# Patient Record
Sex: MALE | Race: WHITE | NOT HISPANIC OR LATINO | Employment: OTHER | URBAN - METROPOLITAN AREA
[De-identification: names, ages, dates, MRNs, and addresses within clinical notes are randomized per-mention and may not be internally consistent; named-entity substitution may affect disease eponyms.]

---

## 2018-08-06 ENCOUNTER — HOSPITAL ENCOUNTER (OUTPATIENT)
Dept: CT IMAGING | Facility: CLINIC | Age: 83
Discharge: HOME | End: 2018-08-06
Attending: PREVENTIVE MEDICINE | Admitting: PREVENTIVE MEDICINE

## 2018-08-06 DIAGNOSIS — Z00.6 RESEARCH EXAM: ICD-10-CM

## 2018-08-06 PROCEDURE — 75571 CT HRT W/O DYE W/CA TEST: CPT | Mod: TC

## 2022-11-04 ENCOUNTER — HOSPITAL ENCOUNTER (OUTPATIENT)
Dept: NUCLEAR MEDICINE | Facility: CLINIC | Age: 87
Setting detail: NUCLEAR MEDICINE
Discharge: HOME | End: 2022-11-04
Attending: PREVENTIVE MEDICINE

## 2022-11-04 DIAGNOSIS — Z00.6 RESEARCH EXAM: ICD-10-CM

## 2022-11-04 PROCEDURE — 78830 RP LOCLZJ TUM SPECT W/CT 1: CPT | Mod: TC

## 2022-11-04 PROCEDURE — 343N000001 HC RX 343

## 2022-11-04 PROCEDURE — A9538 TC99M PYROPHOSPHATE: HCPCS

## 2022-11-04 RX ORDER — TECHNETIUM TC99M PYROPHOSPHATE 12 MG/10ML
0-20 INJECTION INTRAVENOUS ONCE
Status: COMPLETED | OUTPATIENT
Start: 2022-11-04 | End: 2022-11-04

## 2022-11-04 RX ADMIN — TECHNETIUM TC99M PYROPHOSPHATE 15.8 MILLICURIE: 12 INJECTION INTRAVENOUS at 10:11

## 2022-11-17 ENCOUNTER — LAB REQUISITION (OUTPATIENT)
Dept: LAB | Facility: CLINIC | Age: 87
End: 2022-11-17
Payer: MEDICARE

## 2022-11-17 DIAGNOSIS — R76.12 NONSPECIFIC REACTION TO CELL MEDIATED IMMUNITY MEASUREMENT OF GAMMA INTERFERON ANTIGEN RESPONSE WITHOUT ACTIVE TUBERCULOSIS: ICD-10-CM

## 2022-11-18 PROCEDURE — 36415 COLL VENOUS BLD VENIPUNCTURE: CPT | Performed by: FAMILY MEDICINE

## 2022-11-18 PROCEDURE — 86481 TB AG RESPONSE T-CELL SUSP: CPT | Performed by: FAMILY MEDICINE

## 2022-11-18 PROCEDURE — P9603 ONE-WAY ALLOW PRORATED MILES: HCPCS | Performed by: FAMILY MEDICINE

## 2022-11-19 LAB
QUANTIFERON MITOGEN: 2.08 IU/ML
QUANTIFERON NIL TUBE: 0.02 IU/ML
QUANTIFERON TB1 TUBE: 0.03 IU/ML
QUANTIFERON TB2 TUBE: 0.02

## 2022-11-20 ENCOUNTER — LAB REQUISITION (OUTPATIENT)
Dept: LAB | Facility: CLINIC | Age: 87
End: 2022-11-20
Payer: COMMERCIAL

## 2022-11-20 DIAGNOSIS — R53.83 OTHER FATIGUE: ICD-10-CM

## 2022-11-20 LAB
ALBUMIN UR-MCNC: 50 MG/DL
ANION GAP SERPL CALCULATED.3IONS-SCNC: 13 MMOL/L (ref 7–15)
APPEARANCE UR: ABNORMAL
BACTERIA #/AREA URNS HPF: ABNORMAL /HPF
BILIRUB UR QL STRIP: NEGATIVE
BUN SERPL-MCNC: 21.7 MG/DL (ref 8–23)
CALCIUM SERPL-MCNC: 9.4 MG/DL (ref 8.8–10.2)
CHLORIDE SERPL-SCNC: 95 MMOL/L (ref 98–107)
COLOR UR AUTO: YELLOW
CREAT SERPL-MCNC: 1.36 MG/DL (ref 0.67–1.17)
DEPRECATED HCO3 PLAS-SCNC: 23 MMOL/L (ref 22–29)
GAMMA INTERFERON BACKGROUND BLD IA-ACNC: 0.02 IU/ML
GFR SERPL CREATININE-BSD FRML MDRD: 51 ML/MIN/1.73M2
GLUCOSE SERPL-MCNC: 246 MG/DL (ref 70–99)
GLUCOSE UR STRIP-MCNC: 200 MG/DL
HGB UR QL STRIP: ABNORMAL
KETONES UR STRIP-MCNC: ABNORMAL MG/DL
LEUKOCYTE ESTERASE UR QL STRIP: ABNORMAL
M TB IFN-G BLD-IMP: NEGATIVE
M TB IFN-G CD4+ BCKGRND COR BLD-ACNC: 2.06 IU/ML
MITOGEN IGNF BCKGRD COR BLD-ACNC: 0 IU/ML
MITOGEN IGNF BCKGRD COR BLD-ACNC: 0.01 IU/ML
NITRATE UR QL: NEGATIVE
PH UR STRIP: 6 [PH] (ref 5–7)
POTASSIUM SERPL-SCNC: 4.9 MMOL/L (ref 3.4–5.3)
RBC URINE: 13 /HPF
SODIUM SERPL-SCNC: 131 MMOL/L (ref 136–145)
SP GR UR STRIP: 1.02 (ref 1–1.03)
SQUAMOUS EPITHELIAL: <1 /HPF
TRANSITIONAL EPI: <1 /HPF
UROBILINOGEN UR STRIP-MCNC: NORMAL MG/DL
WBC URINE: 136 /HPF

## 2022-11-20 PROCEDURE — 80048 BASIC METABOLIC PNL TOTAL CA: CPT | Performed by: FAMILY MEDICINE

## 2022-11-20 PROCEDURE — 81001 URINALYSIS AUTO W/SCOPE: CPT | Performed by: FAMILY MEDICINE

## 2022-11-21 ENCOUNTER — LAB REQUISITION (OUTPATIENT)
Dept: LAB | Facility: CLINIC | Age: 87
End: 2022-11-21
Payer: COMMERCIAL

## 2022-11-21 DIAGNOSIS — R53.83 OTHER FATIGUE: ICD-10-CM

## 2022-11-21 LAB
BASOPHILS # BLD AUTO: 0.1 10E3/UL (ref 0–0.2)
BASOPHILS NFR BLD AUTO: 1 %
EOSINOPHIL # BLD AUTO: 0.7 10E3/UL (ref 0–0.7)
EOSINOPHIL NFR BLD AUTO: 7 %
ERYTHROCYTE [DISTWIDTH] IN BLOOD BY AUTOMATED COUNT: 15.3 % (ref 10–15)
HCT VFR BLD AUTO: 38.2 % (ref 40–53)
HGB BLD-MCNC: 12.9 G/DL (ref 13.3–17.7)
IMM GRANULOCYTES # BLD: 0.1 10E3/UL
IMM GRANULOCYTES NFR BLD: 1 %
LYMPHOCYTES # BLD AUTO: 1.1 10E3/UL (ref 0.8–5.3)
LYMPHOCYTES NFR BLD AUTO: 11 %
MCH RBC QN AUTO: 30.3 PG (ref 26.5–33)
MCHC RBC AUTO-ENTMCNC: 33.8 G/DL (ref 31.5–36.5)
MCV RBC AUTO: 90 FL (ref 78–100)
MONOCYTES # BLD AUTO: 1.5 10E3/UL (ref 0–1.3)
MONOCYTES NFR BLD AUTO: 16 %
NEUTROPHILS # BLD AUTO: 6.3 10E3/UL (ref 1.6–8.3)
NEUTROPHILS NFR BLD AUTO: 64 %
NRBC # BLD AUTO: 0 10E3/UL
NRBC BLD AUTO-RTO: 0 /100
PLATELET # BLD AUTO: 176 10E3/UL (ref 150–450)
RBC # BLD AUTO: 4.26 10E6/UL (ref 4.4–5.9)
WBC # BLD AUTO: 9.8 10E3/UL (ref 4–11)

## 2022-11-21 PROCEDURE — 85025 COMPLETE CBC W/AUTO DIFF WBC: CPT | Performed by: FAMILY MEDICINE

## 2022-11-22 ENCOUNTER — LAB REQUISITION (OUTPATIENT)
Dept: LAB | Facility: CLINIC | Age: 87
End: 2022-11-22
Payer: COMMERCIAL

## 2022-11-22 DIAGNOSIS — N18.9 CHRONIC KIDNEY DISEASE, UNSPECIFIED: ICD-10-CM

## 2022-11-23 ENCOUNTER — LAB REQUISITION (OUTPATIENT)
Dept: LAB | Facility: CLINIC | Age: 87
End: 2022-11-23
Payer: MEDICARE

## 2022-11-23 DIAGNOSIS — D72.829 ELEVATED WHITE BLOOD CELL COUNT, UNSPECIFIED: ICD-10-CM

## 2022-11-23 LAB
ANION GAP SERPL CALCULATED.3IONS-SCNC: 12 MMOL/L (ref 7–15)
BUN SERPL-MCNC: 25.7 MG/DL (ref 8–23)
CALCIUM SERPL-MCNC: 9 MG/DL (ref 8.8–10.2)
CHLORIDE SERPL-SCNC: 98 MMOL/L (ref 98–107)
CREAT SERPL-MCNC: 1.32 MG/DL (ref 0.67–1.17)
DEPRECATED HCO3 PLAS-SCNC: 23 MMOL/L (ref 22–29)
GFR SERPL CREATININE-BSD FRML MDRD: 53 ML/MIN/1.73M2
GLUCOSE SERPL-MCNC: 179 MG/DL (ref 70–99)
POTASSIUM SERPL-SCNC: 3.8 MMOL/L (ref 3.4–5.3)
SODIUM SERPL-SCNC: 133 MMOL/L (ref 136–145)

## 2022-11-23 PROCEDURE — 36415 COLL VENOUS BLD VENIPUNCTURE: CPT | Performed by: FAMILY MEDICINE

## 2022-11-23 PROCEDURE — P9603 ONE-WAY ALLOW PRORATED MILES: HCPCS | Performed by: FAMILY MEDICINE

## 2022-11-23 PROCEDURE — 82310 ASSAY OF CALCIUM: CPT | Performed by: FAMILY MEDICINE

## 2022-12-06 ENCOUNTER — LAB REQUISITION (OUTPATIENT)
Dept: LAB | Facility: CLINIC | Age: 87
End: 2022-12-06
Payer: COMMERCIAL

## 2022-12-06 DIAGNOSIS — N18.9 CHRONIC KIDNEY DISEASE, UNSPECIFIED: ICD-10-CM

## 2022-12-07 LAB
ANION GAP SERPL CALCULATED.3IONS-SCNC: 12 MMOL/L (ref 7–15)
BUN SERPL-MCNC: 13.8 MG/DL (ref 8–23)
CALCIUM SERPL-MCNC: 9.4 MG/DL (ref 8.8–10.2)
CHLORIDE SERPL-SCNC: 103 MMOL/L (ref 98–107)
CREAT SERPL-MCNC: 1.07 MG/DL (ref 0.67–1.17)
DEPRECATED HCO3 PLAS-SCNC: 23 MMOL/L (ref 22–29)
GFR SERPL CREATININE-BSD FRML MDRD: 68 ML/MIN/1.73M2
GLUCOSE SERPL-MCNC: 91 MG/DL (ref 70–99)
POTASSIUM SERPL-SCNC: 4.2 MMOL/L (ref 3.4–5.3)
SODIUM SERPL-SCNC: 138 MMOL/L (ref 136–145)

## 2022-12-07 PROCEDURE — P9603 ONE-WAY ALLOW PRORATED MILES: HCPCS | Performed by: FAMILY MEDICINE

## 2022-12-07 PROCEDURE — 36415 COLL VENOUS BLD VENIPUNCTURE: CPT | Performed by: FAMILY MEDICINE

## 2022-12-07 PROCEDURE — 80048 BASIC METABOLIC PNL TOTAL CA: CPT | Performed by: FAMILY MEDICINE

## 2023-08-30 ENCOUNTER — OFFICE VISIT (OUTPATIENT)
Dept: PODIATRY | Facility: OTHER | Age: 88
End: 2023-08-30
Payer: COMMERCIAL

## 2023-08-30 VITALS
WEIGHT: 183 LBS | DIASTOLIC BLOOD PRESSURE: 80 MMHG | SYSTOLIC BLOOD PRESSURE: 137 MMHG | TEMPERATURE: 97.3 F | HEIGHT: 70 IN | HEART RATE: 71 BPM | BODY MASS INDEX: 26.2 KG/M2

## 2023-08-30 DIAGNOSIS — M76.70 PERONEAL TENDINITIS, UNSPECIFIED LATERALITY: ICD-10-CM

## 2023-08-30 DIAGNOSIS — M15.0 PRIMARY OSTEOARTHRITIS INVOLVING MULTIPLE JOINTS: ICD-10-CM

## 2023-08-30 DIAGNOSIS — E11.40 CONTROLLED TYPE 2 DIABETES WITH NEUROPATHY (H): Primary | ICD-10-CM

## 2023-08-30 PROCEDURE — 99203 OFFICE O/P NEW LOW 30 MIN: CPT | Performed by: PODIATRIST

## 2023-08-30 RX ORDER — INSULIN ASPART 100 [IU]/ML
6 INJECTION, SOLUTION INTRAVENOUS; SUBCUTANEOUS
COMMUNITY
Start: 2022-12-19

## 2023-08-30 RX ORDER — INSULIN LISPRO 100 [IU]/ML
INJECTION, SOLUTION INTRAVENOUS; SUBCUTANEOUS
COMMUNITY
Start: 2023-01-16

## 2023-08-30 RX ORDER — PROCHLORPERAZINE 25 MG/1
SUPPOSITORY RECTAL
COMMUNITY
Start: 2022-11-08

## 2023-08-30 RX ORDER — VITAMIN B COMPLEX
1000 TABLET ORAL DAILY
COMMUNITY

## 2023-08-30 RX ORDER — PREDNISONE 1 MG/1
3 TABLET ORAL
COMMUNITY
Start: 2023-06-27

## 2023-08-30 RX ORDER — METOPROLOL TARTRATE 25 MG/1
TABLET, FILM COATED ORAL
COMMUNITY
Start: 2023-03-30

## 2023-08-30 RX ORDER — FUROSEMIDE 20 MG
1 TABLET ORAL EVERY MORNING
COMMUNITY
Start: 2022-11-22

## 2023-08-30 RX ORDER — FAMOTIDINE 40 MG/1
40 TABLET, FILM COATED ORAL DAILY
COMMUNITY
Start: 2023-05-08

## 2023-08-30 RX ORDER — POTASSIUM CHLORIDE 750 MG/1
1 TABLET, EXTENDED RELEASE ORAL
COMMUNITY
Start: 2023-05-08

## 2023-08-30 RX ORDER — WARFARIN SODIUM 3 MG/1
TABLET ORAL
COMMUNITY
Start: 2023-08-24

## 2023-08-30 ASSESSMENT — PAIN SCALES - GENERAL: PAINLEVEL: MODERATE PAIN (5)

## 2023-08-30 NOTE — PATIENT INSTRUCTIONS
"DIABETES AND YOUR FEET    What effect does diabetes have on the feet?  Diabetes can result in several problems in the feet including contractures of the tendons leading to deformities and reduced function of the bones, skin ulcers or open sores on pressure points or prominent deformities, reduced sensation, reduced blood flow and thus reduced oxygen and immune cells to the tiny vessels in our feet. This all leads to higher risk of hospitalization, infections, and amputations.     What is neuropathy?  Neuropathy is a term used to describe a loss of nerve function.  Patients with diabetes are at risk of developing neuropathy if their sugars continue to run high and are above the normal value of 140.  The elevated blood sugar in the body enters the nerves causing it to swell and impair nerve function.  The higher the blood sugar and the longer it is elevated, the more damage is done to nerves.  This damage is permanent and irreversible.  These damaged sensory nerves can then cause reduced feeling or cause pain.  Damaged motor nerves can reduce blood flow and white blood cells into into your foot, skin and bones reducing your ability to heal a small problem. And neuropathy can cause tendons to become unbalanced and contribute to the formation of deformity and contractures in our feet. Often times, neuropathy can be prevented by controlling your blood sugar.  Your risk of developing neuropathy goes up dramatically as your hemoglobin A1C raises above 7.5.      How do I know if I have neuropathy?  When a person develops neuropathy, they usually begin to feel numbness or tingling in their feet and sometimes in their legs.  Other symptoms may include painful burning or hot feet, tingling, electrical sensations or feeling like insects or ants are crawling on your feet or legs.  If blood sugar remains above 140  for long periods of time, neuropathy can also occur in the hands.  When a person loses their \"protective threshold\" " or ability to detect a 5.07 [place] Cosme monofilament is when they have elevated risk for developing foot deformity, contractures, foot infections, amputations, Charcot arthropathy, or other complications. Keep your hemoglobin A1C below 7.5 to reduce this risk.    What is vascular disease?  Peripheral vascular disease is a term used to describe a loss or decrease in circulation (blood flow).  There is a problem in getting blood, immune cells, and oxygen to areas that need it.  Similar to neuropathy, sugars can build up in the walls of the arteries (blood vessels) and cause them to become swollen, thickened and hardened.  This decreases the amount of blood that can go to an area that needs it.  Though this is common in the legs of diabetic patients, it can also affect other arteries (blood vessels) in the body such as in the heart, kidney, eyes, and the blood flow into bones.  It is often seen first in the small vessels of her body notably our feet and toes.    How do I know if I have vascular disease?  In the legs, vascular disease usually results in cramping.  Patients who develop leg cramps after walking the same distance every time (i.e. One block, half a mile, ect.) need to let their doctors know so that their circulation may be checked.  Cramps causing severe pain in the feet and/or legs while sleeping and the cramps go away when you stand or hang your legs off the side of the bed, may also be a sign of poor blood circulation.  Occasional cramping in cold weather or on rare occasions with activity may not be due to poor circulation, but you should inform your doctor.    How can these problems be prevented?  The key to prevention is good blood sugar control all day every day.  Inadequate blood sugar control is the most common way patients experience these problems. Reducing, controlling and measuring your daily consumption of sugar or carbohydrates is essential to understanding and managing diabetes.   Physical activity (exercise) is a very good way to help decrease your blood sugars.  Exercise can lower your blood sugar, blood pressure, and cholesterol.  It also reduces your risk for heart disease and stroke, relieves stress, and strengthens your heart, muscles and bones. Physical activity also increases your balance and reduces development of contractures and foot deformities over time. In addition, regular activity helps insulin work better, improves your blood circulation, and keeps your joints flexible.  If you're trying to lose weight, a combination of exercise and wise food choices can help you reach your target weight and maintain it.  Activity and exercise alone can not make up for poor diet choices, eating too much, or eating too many sugars or carbohydrates.  Ask your doctor for help when you are not meeting your blood sugar goals. Changes or increases in medication are powerful tools in reducing your blood sugar.    Know your blood sugar and hemoglobin A1C trend.  Upon first diagnosis or during acute illness, checking your blood sugar 4 times a day can help you understand how your diet, activity, and lifestyle affect your blood sugar.  Monitoring your hemoglobin A1C can help you understand how well you are managing blood sugar over the long run.  Your hemoglobin A1C tells you what your blood sugar averages all day, every day, over the past 90 days.       To experience the lowest risk of complications associated with diabetes such as neuropathy, loss of blood flow, bone or joint infection, charcot arthropathy, or amputation, the American Diabetes Association recommends a target hemoglobin A1C of less than 7.0%, while the American Association of Clinical Endocrinologists' recommendation is 6.5% or less.  Both organizations advise that the goals be individualized based on patient factors such as other health conditions, history of hypoglycemia, education, and life expectancy.  A patients risk of  experiencing complications associated with diabetes is only slightly elevated with a hemoglobin A1C above 6.0.  However, this risk goes up exponentially when the hemoglobin A1C is above 7.5.  The longer the hemoglobin A1C is elevated, the more risk that patient will experience in their lifetime. The damage that occurs to nerves, blood vessels, tendons, bones and body organs, while their hemoglobin A1C is elevated is mostly irreversible and worsens with each additional time period of elevated hemoglobin A1C.     You must understand and manage your disease.  Your health insurance or medical team cannot manage this disease for you.  When you take responsibility for understanding and managing your disease, you can expect to experience fewer problems associated with diabetes in your lifetime.  You will  Also experience a higher quality of life and health and reduced cost of health care.    Diabetic Foot Care Recommendations  The following are recommendations for avoiding serious foot problems or injury    DO'S  1. Be aware of your hemoglobin A1C and continue to follow up with your medical team for adjustments in your lifestyle and medication until your reach your A1C goal.  Keep this below 7.5 to reduce your risk of developing complications associated with diabetes.    2.  Wash your feet with lukewarm water and a mild soap and then dry them thoroughly, especially between the toes.  Gently floss your towel or washcloth between each toe at every bath.  Soaking your feet in water cannot clean dead skin, debris, and bacteria from your feet and is not necessary.   3. Examine your feet daily looking for cuts, corns, blisters, cracks, ect..., especially after wearing new shoes or increased or changed activities.  Make sure to look between your toes.  If you cannot see the bottom of your feet, set a mirror on the floor and hold your foot over it, or ask a family member to examine your feet for you daily.  Contact your doctor  immediately if new problems are noted or if sores are not healing.  4.  Immediately apply moisturizer cream such as Cetaphil to the tops and bottoms of your feet, avoiding areas between the toes.  Apply sunscreen or cover your feet if they will be exposed to extended sunlight.  5.Use clean comfortable shoes.  Socks should not have thick seams or cut off the circulation around the leg.  Break in new shoes slowly and rotate with older shoes until broken in.  Check the inside of your shoes with your hand to look for areas of irritation or objects that may have fallen into your shoes.    6. Keep slippers by the side of your bed for use during the night.  7. Shoes should be fitted by a professional and should not cause areas of irritation.  Check your feet regularly when wearing a new pair of shoes and replace them as needed.  8.  Talk to your doctor about proper exercise.  Exercise and stretching stimulate blood flow to your feet and maintain proper glucose levels.  Use it or lose it!  9.  Monitor your blood glucose level and your hemoglobin A1C.  Notify your doctor immediately if your blood sugar is abnormally high or low.  10.  Cut your nails straight across, but then gently round any sharp edges with a nail file.  If you have neuropathy, peripheral vascular disease or cannot see that well to trim your own toenails, see a medical professional for care.    DONT'S  1.  Do not soak your feet if you have an open sore or your provider has informed you that you have neuropathy or loss of protective threshold.  Use only lukewarm water and always check the temperature with your hand as hot water can easily burn your feet.    2.  Never use a hot water bottle or heating pad on your feet.  Also do not apply hot or cold compresses to your feet.  With decreased sensation, you could burn or freeze your feet.  Do not rest your feet near a heat source such as a heater or heat register.    3.  Do not apply any of these to your  "feet:    - over the counter medicine for corns or warts    -  Harsh chemicals like boric acid    -  Do not self-treat corns, cuts, blisters or infections.  Always consult your doctor.   4.  Do not wear sandals, slippers or walk barefoot, especially on harsh surfaces.  5.  If you smoke, stop!!!        Nail Debridement    A high quality instrument makes trimming toenails MUCH easier.  Search [employer] for any 5\" nail nipper manufactured by reliable brands such as Miltex, Integra or Jarit as these quality instruments will help manage difficult nails more effectively and comfortably. We use Miltex -SS.  A physician is not necessary to trim nails even if you are taking blood thinners or are diabetic.  Your family or care givers may help manage your toenails.      Trim or sand the nails once weekly.  Do not wait until they are long and painful or trimming will become too difficult and painful and will increase your risk of complications or infection.  A course file or 120 grit sandpaper on a sanding block can be helpful.  For very thick nails many people prefer battery operated smith such as an Amope', Personal Pedi and Emjoi for regular use or heavy painful callouses or thick toenails.    Trim or skive any portion of nail that is thick, loose, crumbling, or not well attached. Do not tear the nail away, but rather cut them with a nail nipperor sand or sand them down.  You may follow up with your Podiatric Physician if you have pain, bleeding, infection, questions or other concerns.      You may also contact the following Registered Nurses for further help with nail debridement and minor hygiene concnerns.  They may come to your home or meet them at their clinic to trim your toenails and soak your feet, as well as monitor for any complications that would require evaluation by a Physician.      [facility]  Laurel Jimenez RN  Phone & text [phone]    [facility]  Adina Morris, " RN  Office [phone]    [facility]  [phone]  [email or web address]    For up to date list and to find foot care nurses in other communities visit American Foot Care Nurses Association website:  afcna.org.     Calluses, Corns, IPKs, Porokeratosis    When there is excessive friction or pressure on the skin, the body responds by making the skin thicker.  While this may protect the deeper structures, the thickened skin can take up more space and thus increase pressure over a bony prominence or become an open sore or skin ulcer as this skin becomes less flexible.    Flat, diffuse thickening are simple calluses and they are usually caused by friction.  Often these are the result of rubbing on a shoe or going barefoot.    Calluses with a central core between the toes are called corns.  These often result from prominent joints on adjacent toes rubbing together.  Theses are often a symptom of bone malalignment and will usually recur unless the underlying bones are addressed.    Many of these lesions can be kept comfortable with routine maintenance. This consists of filing them with a Ped Egg, callus file, or 120 grit sandpaper on a block, every day during your bath or shower.  Most people prefer battery operated smith such as an Amope', Personal Pedi and Emjoi for regular use or heavy painful callouses.  Heavy creams or ointments can be applied 1-2 times every day to keep them soft. Toe spacers can be used for corns, gel pads can be used for other lesions on the bottom of the foot. If there is a deformity noted, such as a prominent bone, often this can be addressed to minimize recurrence. However, sometimes the pressure and lesion simply migrates to another spot after surgery, so it is not a guaranteed cure.     If you have severe callouses and cracking, you may apply heavy ointments that you scoop up such as Cetaphil cream, Eucerin, Aquaphor or Vaseline.  Be sure to obtain cream  or ointment in these brands and not lotion (lotion is water based and not durable enough for feet). For more aggressive help apply heavy creams or ointment under occlusive dressings such as Saran Wrap or Jelly Feet while sleeping.   Jelly Feet can be obtained at [email or web address].     To be successful with managing hyperkeratotic skin, you must manage hygiene daily.  Apply the cream once or twice EVERY day.  At your bath or shower time is the easiest time to work on this when skin is most soft.  There is no medical or surgical treatment that will absolutely eliminate many of these symptoms.      Pedifix is a reliable source for all sorts of foot pads, cushions, or interdigital spacers and foot appliances. Go to www.[employer].Th[employer] or request a catalog at [employer].        Please call with any additional questions.

## 2023-08-30 NOTE — LETTER
8/30/2023         RE: Corwin Fontaine  [address] [facility] [address]        Dear Colleague,    Thank you for referring your patient, Corwin Fontaine, to the [facility]. Please see a copy of my visit note below.    HPI:  Corwin Fontaine is a 88 year old male who is seen in consultation at the request of self.    Pt presents for eval of:   (Onset, Location, L/R, Character, Treatments, Injury if yes)    DM type 2    Warfarin    Stroke 2020, left side     Ongoing for 1 year, lateral Left ankle pain that is more constant June 2023. Presents with his youngest daughter.  Pain in lateral malleolus left and under right 5th met head.  Hx of ulcers on both feet.     Retired.      Review of Systems:  Patient denies fever, chills, rash, wound, stiffness, limping, numbness, weakness, heart burn, blood in stool, chest pain with activity, calf pain when walking, shortness of breath with activity, chronic cough, easy bleeding/bruising, swelling of ankles, excessive thirst, fatigue, depression, anxiety.  Patient admits only to symptoms noted in history.     There is no problem list on file for this patient.    PAST MEDICAL HISTORY: History reviewed. No pertinent past medical history.  PAST SURGICAL HISTORY: History reviewed. No pertinent surgical history.  MEDICATIONS:   Current Outpatient Medications:      Continuous Blood Gluc Sensor (DEXCOM G6 SENSOR) MISC, TO BE USED TO READ BLOOD SUGARS, FOLLOW  DIRECTIONS. CHANGE SENSOR EVERY 10 DAYS., Disp: , Rfl:      famotidine (PEPCID) 40 MG tablet, Take 40 mg by mouth daily, Disp: , Rfl:      Ferrous Sulfate 5 MG/20ML LIQD, ferrous sulfate, Disp: , Rfl:      furosemide (LASIX) 20 MG tablet, Take 1 tablet by mouth every morning, Disp: , Rfl:      Insulin Aspart FlexPen 100 UNIT/ML SOPN, Inject 6 Units Subcutaneous, Disp: , Rfl:      insulin lispro (HUMALOG VIAL) 100 UNIT/ML vial,  UNITS DAILY VIA INSULIN PUMP, Disp: , Rfl:       "Methotrexate, PF, (RASUVO) 12.5 MG/0.25ML autoinjector, 0.25 mLs, Disp: , Rfl:      metoprolol tartrate (LOPRESSOR) 25 MG tablet, TAKE 1 TABLET TWICE A DAY (REPLACES PREVIOUS PRESCRIPTION), Disp: , Rfl:      potassium chloride ER (K-TAB/KLOR-CON) 10 MEQ CR tablet, Take 1 tablet by mouth daily with food, Disp: , Rfl:      predniSONE (DELTASONE) 1 MG tablet, Take 3 mg by mouth, Disp: , Rfl:      Vitamin D3 (VITAMIN D-1000 MAX ST) 25 mcg (1000 units) tablet, Take 1,000 Units by mouth daily, Disp: , Rfl:      warfarin ANTICOAGULANT (COUMADIN) 3 MG tablet, Take by mouth 6 mg (3 mg x 2) every Tue, Sat; 4.5 mg (3 mg x 1.5) all other days, Disp: , Rfl:   ALLERGIES:    Allergies   Allergen Reactions     Bee Venom Hives and Rash     SOCIAL HISTORY:   Social History     Socioeconomic History     Marital status:      Spouse name: Not on file     Number of children: Not on file     Years of education: Not on file     Highest education level: Not on file   Occupational History     Not on file   Tobacco Use     Smoking status: Former     Types: Cigarettes     Smokeless tobacco: Former   Substance and Sexual Activity     Alcohol use: Not on file     Drug use: Not on file     Sexual activity: Not on file   Other Topics Concern     Not on file   Social History Narrative     Not on file     Social Determinants of Health     Financial Resource Strain: Not on file   Food Insecurity: Not on file   Transportation Needs: Not on file   Physical Activity: Not on file   Stress: Not on file   Social Connections: Not on file   Intimate Partner Violence: Not on file   Housing Stability: Not on file     FAMILY HISTORY: History reviewed. No pertinent family history.  EXAM:Vitals: /80 (BP Location: Left arm, Patient Position: Sitting, Cuff Size: Adult Regular)   Pulse 71   Temp 97.3  F (36.3  C) (Temporal)   Ht 1.765 m (5' 9.5\")   Wt 83 kg (183 lb)   BMI 26.64 kg/m    BMI= Body mass index is 26.64 kg/m .    General appearance: " Patient is alert and fully cooperative with history & exam.  No sign of distress is noted during the visit.     Psychiatric: Affect is pleasant & appropriate.  Patient appears motivated to improve health.     Respiratory: Breathing is regular & unlabored while sitting.     HEENT: Hearing is intact to spoken word.  Speech is clear.  No gross evidence of visual impairment that would impact ambulation.       Vascular: DP 1/4 & PT 0/4 left & right.  CFT delayed with dependent rubor noted about the digits.  Diminished hair growth distal to mid tibia and no hair about the foot and toes.  Temperature changes noted, warm to cool proximal to distal.  Hemosiderin pigmentation noted with multiple varicosities legs and feet bilateral. Generalized edema bilateral legs and feet.  Pt denies claudication history.     Neurologic: Normal plantar response bilateral.  Managed protective threshold plus 0/10 applications of a 5.07 monofilament.  Pt admits  burning and paraesthesias about the feet and toes with palpation.     Dermatologic: All 10 nails are thickened, elongated, discolored and painful with palpation and debridement.  Diminished texture turgor and tone about the integument.  Skin is thin & shiny.  No  Pre ulcerative hyperkeratosis noted.  No abscess or full thickness ulcerations noted.     Musculoskeletal: Patient is ambulatory without assistive device or brace.  There is semi reducible contracture of the lesser digits.  Subtle discomfort is noted about the lateral ankle sinus tarsi peroneal tendons fifth metatarsal base.  No palpable localized edema.  No erythema or heat.  Mildly reduced range of motion through the ankle and subtalar joint.    Creatinine (mg/dL)   Date Value   12/07/2022 1.07   11/23/2022 1.32 (H)   11/20/2022 1.36 (H)     ASSESSMENT:       ICD-10-CM    1. 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 Peroneal tendinitis, unspecified laterality  M76.70       3. Primary osteoarthritis  involving multiple joints  M15.9            PLAN:    8/30/2023  Recommend appropriate shoe gear to provide stability about the lateral column peroneal tendons.  Just got DM shoes within this year.  Discussed the importance of appropriate shoe gear.  May modify the shoe if they are not working properly or fitting well.  All questions were answered  Follow-up if this remains symptomatic otherwise once yearly for diabetic foot exam.      Darrian Degroot DPM          Again, thank you for allowing me to participate in the care of your patient.        Sincerely,        Darrian Degroot DPM

## 2023-08-30 NOTE — PROGRESS NOTES
HPI:  Corwin Fontaine is a 88 year old male who is seen in consultation at the request of self.    Pt presents for eval of:   (Onset, Location, L/R, Character, Treatments, Injury if yes)    DM type 2    Warfarin    Stroke 2020, left side     Ongoing for 1 year, lateral Left ankle pain that is more constant June 2023. Presents with his youngest daughter.  Pain in lateral malleolus left and under right 5th met head.  Hx of ulcers on both feet.     Retired.      Review of Systems:  Patient denies fever, chills, rash, wound, stiffness, limping, numbness, weakness, heart burn, blood in stool, chest pain with activity, calf pain when walking, shortness of breath with activity, chronic cough, easy bleeding/bruising, swelling of ankles, excessive thirst, fatigue, depression, anxiety.  Patient admits only to symptoms noted in history.     There is no problem list on file for this patient.    PAST MEDICAL HISTORY: History reviewed. No pertinent past medical history.  PAST SURGICAL HISTORY: History reviewed. No pertinent surgical history.  MEDICATIONS:   Current Outpatient Medications:     Continuous Blood Gluc Sensor (DEXCOM G6 SENSOR) MISC, TO BE USED TO READ BLOOD SUGARS, FOLLOW  DIRECTIONS. CHANGE SENSOR EVERY 10 DAYS., Disp: , Rfl:     famotidine (PEPCID) 40 MG tablet, Take 40 mg by mouth daily, Disp: , Rfl:     Ferrous Sulfate 5 MG/20ML LIQD, ferrous sulfate, Disp: , Rfl:     furosemide (LASIX) 20 MG tablet, Take 1 tablet by mouth every morning, Disp: , Rfl:     Insulin Aspart FlexPen 100 UNIT/ML SOPN, Inject 6 Units Subcutaneous, Disp: , Rfl:     insulin lispro (HUMALOG VIAL) 100 UNIT/ML vial,  UNITS DAILY VIA INSULIN PUMP, Disp: , Rfl:     Methotrexate, PF, (RASUVO) 12.5 MG/0.25ML autoinjector, 0.25 mLs, Disp: , Rfl:     metoprolol tartrate (LOPRESSOR) 25 MG tablet, TAKE 1 TABLET TWICE A DAY (REPLACES PREVIOUS PRESCRIPTION), Disp: , Rfl:     potassium chloride ER (K-TAB/KLOR-CON) 10 MEQ CR tablet,  "Take 1 tablet by mouth daily with food, Disp: , Rfl:     predniSONE (DELTASONE) 1 MG tablet, Take 3 mg by mouth, Disp: , Rfl:     Vitamin D3 (VITAMIN D-1000 MAX ST) 25 mcg (1000 units) tablet, Take 1,000 Units by mouth daily, Disp: , Rfl:     warfarin ANTICOAGULANT (COUMADIN) 3 MG tablet, Take by mouth 6 mg (3 mg x 2) every Tue, Sat; 4.5 mg (3 mg x 1.5) all other days, Disp: , Rfl:   ALLERGIES:    Allergies   Allergen Reactions    Bee Venom Hives and Rash     SOCIAL HISTORY:   Social History     Socioeconomic History    Marital status:      Spouse name: Not on file    Number of children: Not on file    Years of education: Not on file    Highest education level: Not on file   Occupational History    Not on file   Tobacco Use    Smoking status: Former     Types: Cigarettes    Smokeless tobacco: Former   Substance and Sexual Activity    Alcohol use: Not on file    Drug use: Not on file    Sexual activity: Not on file   Other Topics Concern    Not on file   Social History Narrative    Not on file     Social Determinants of Health     Financial Resource Strain: Not on file   Food Insecurity: Not on file   Transportation Needs: Not on file   Physical Activity: Not on file   Stress: Not on file   Social Connections: Not on file   Intimate Partner Violence: Not on file   Housing Stability: Not on file     FAMILY HISTORY: History reviewed. No pertinent family history.  EXAM:Vitals: /80 (BP Location: Left arm, Patient Position: Sitting, Cuff Size: Adult Regular)   Pulse 71   Temp 97.3  F (36.3  C) (Temporal)   Ht 1.765 m (5' 9.5\")   Wt 83 kg (183 lb)   BMI 26.64 kg/m    BMI= Body mass index is 26.64 kg/m .    General appearance: Patient is alert and fully cooperative with history & exam.  No sign of distress is noted during the visit.     Psychiatric: Affect is pleasant & appropriate.  Patient appears motivated to improve health.     Respiratory: Breathing is regular & unlabored while sitting.     HEENT: " Hearing is intact to spoken word.  Speech is clear.  No gross evidence of visual impairment that would impact ambulation.       Vascular: DP 1/4 & PT 0/4 left & right.  CFT delayed with dependent rubor noted about the digits.  Diminished hair growth distal to mid tibia and no hair about the foot and toes.  Temperature changes noted, warm to cool proximal to distal.  Hemosiderin pigmentation noted with multiple varicosities legs and feet bilateral. Generalized edema bilateral legs and feet.  Pt denies claudication history.     Neurologic: Normal plantar response bilateral.  Managed protective threshold plus 0/10 applications of a 5.07 monofilament.  Pt admits  burning and paraesthesias about the feet and toes with palpation.     Dermatologic: All 10 nails are thickened, elongated, discolored and painful with palpation and debridement.  Diminished texture turgor and tone about the integument.  Skin is thin & shiny.  No  Pre ulcerative hyperkeratosis noted.  No abscess or full thickness ulcerations noted.     Musculoskeletal: Patient is ambulatory without assistive device or brace.  There is semi reducible contracture of the lesser digits.  Subtle discomfort is noted about the lateral ankle sinus tarsi peroneal tendons fifth metatarsal base.  No palpable localized edema.  No erythema or heat.  Mildly reduced range of motion through the ankle and subtalar joint.    Creatinine (mg/dL)   Date Value   12/07/2022 1.07   11/23/2022 1.32 (H)   11/20/2022 1.36 (H)     ASSESSMENT:       ICD-10-CM    1. 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 Peroneal tendinitis, unspecified laterality  M76.70       3. Primary osteoarthritis involving multiple joints  M15.9            PLAN:    8/30/2023  Recommend appropriate shoe gear to provide stability about the lateral column peroneal tendons.  Just got DM shoes within this year.  Discussed the importance of appropriate shoe gear.  May modify the shoe if they are not  working properly or fitting well.  All questions were answered  Follow-up if this remains symptomatic otherwise once yearly for diabetic foot exam.      Darrian Degroot DPM

## 2024-06-24 ENCOUNTER — OFFICE VISIT (OUTPATIENT)
Dept: PODIATRY | Facility: CLINIC | Age: 89
End: 2024-06-24
Payer: COMMERCIAL

## 2024-06-24 VITALS
WEIGHT: 188 LBS | SYSTOLIC BLOOD PRESSURE: 102 MMHG | HEIGHT: 70 IN | BODY MASS INDEX: 26.92 KG/M2 | DIASTOLIC BLOOD PRESSURE: 58 MMHG

## 2024-06-24 DIAGNOSIS — L97.509 TYPE 2 DIABETES MELLITUS WITH FOOT ULCER, WITH LONG-TERM CURRENT USE OF INSULIN (H): ICD-10-CM

## 2024-06-24 DIAGNOSIS — E11.40 CONTROLLED TYPE 2 DIABETES WITH NEUROPATHY (H): Primary | ICD-10-CM

## 2024-06-24 DIAGNOSIS — Z79.4 TYPE 2 DIABETES MELLITUS WITH FOOT ULCER, WITH LONG-TERM CURRENT USE OF INSULIN (H): ICD-10-CM

## 2024-06-24 DIAGNOSIS — E11.621 TYPE 2 DIABETES MELLITUS WITH FOOT ULCER, WITH LONG-TERM CURRENT USE OF INSULIN (H): ICD-10-CM

## 2024-06-24 PROCEDURE — 99213 OFFICE O/P EST LOW 20 MIN: CPT | Mod: 25 | Performed by: PODIATRIST

## 2024-06-24 PROCEDURE — 11042 DBRDMT SUBQ TIS 1ST 20SQCM/<: CPT | Performed by: PODIATRIST

## 2024-06-24 RX ORDER — FOLIC ACID 1 MG/1
TABLET ORAL
COMMUNITY

## 2024-06-24 RX ORDER — ATORVASTATIN CALCIUM 10 MG/1
10 TABLET, FILM COATED ORAL
COMMUNITY
End: 2024-06-24

## 2024-06-24 RX ORDER — ALENDRONATE SODIUM 35 MG/1
TABLET ORAL
COMMUNITY

## 2024-06-24 RX ORDER — FERROUS SULFATE 325(65) MG
325 TABLET ORAL
COMMUNITY
Start: 2022-11-16

## 2024-06-24 ASSESSMENT — PAIN SCALES - GENERAL: PAINLEVEL: SEVERE PAIN (7)

## 2024-06-24 NOTE — PROGRESS NOTES
Chief Complaint   Patient presents with    Diabetes     Type 2    RECHECK     Callus Left and Right 1st and 5th metatarsal head; LOV 8/30/2023    Other     Presents with his daughter 6/24/2024     HPI:  Corwin Fontaine is a 88 year old male who is seen in consultation at the request of self.    Pt presents for eval of:   (Onset, Location, L/R, Character, Treatments, Injury if yes)    DM type 2    Warfarin    Stroke 2020, left side     Ongoing for 1 year, lateral Left ankle pain that is more constant June 2023. Presents with his youngest daughter.  Pain in lateral malleolus left and under right 5th met head.  Hx of ulcers on both feet.     Retired.      Review of Systems:  Patient denies fever, chills, rash, wound, stiffness, limping, numbness, weakness, heart burn, blood in stool, chest pain with activity, calf pain when walking, shortness of breath with activity, chronic cough, easy bleeding/bruising, swelling of ankles, excessive thirst, fatigue, depression, anxiety.  Patient admits only to symptoms noted in history.     There is no problem list on file for this patient.    PAST MEDICAL HISTORY: History reviewed. No pertinent past medical history.  PAST SURGICAL HISTORY: History reviewed. No pertinent surgical history.  MEDICATIONS:   Current Outpatient Medications:     alendronate (FOSAMAX) 35 MG tablet, Take 1 tablet every week by oral route., Disp: , Rfl:     Continuous Blood Gluc Sensor (DEXCOM G6 SENSOR) MISC, TO BE USED TO READ BLOOD SUGARS, FOLLOW  DIRECTIONS. CHANGE SENSOR EVERY 10 DAYS., Disp: , Rfl:     famotidine (PEPCID) 40 MG tablet, Take 40 mg by mouth daily, Disp: , Rfl:     ferrous sulfate (FEROSUL) 325 (65 Fe) MG tablet, Take 325 mg by mouth, Disp: , Rfl:     furosemide (LASIX) 20 MG tablet, Take 1 tablet by mouth every morning, Disp: , Rfl:     Insulin Aspart FlexPen 100 UNIT/ML SOPN, Inject 6 Units Subcutaneous, Disp: , Rfl:     insulin lispro (HUMALOG VIAL) 100 UNIT/ML vial, ,  Disp: , Rfl:     metoprolol tartrate (LOPRESSOR) 25 MG tablet, TAKE 1 TABLET TWICE A DAY (REPLACES PREVIOUS PRESCRIPTION), Disp: , Rfl:     potassium chloride ER (K-TAB/KLOR-CON) 10 MEQ CR tablet, Take 1 tablet by mouth daily with food, Disp: , Rfl:     predniSONE (DELTASONE) 1 MG tablet, Take 3 mg by mouth, Disp: , Rfl:     Vitamin D3 (VITAMIN D-1000 MAX ST) 25 mcg (1000 units) tablet, Take 1,000 Units by mouth daily, Disp: , Rfl:     warfarin ANTICOAGULANT (COUMADIN) 3 MG tablet, Take by mouth 6 mg (3 mg x 2) every Tue, Sat; 4.5 mg (3 mg x 1.5) all other days, Disp: , Rfl:     Ferrous Sulfate 5 MG/20ML LIQD, ferrous sulfate, Disp: , Rfl:     folic acid (FOLVITE) 1 MG tablet, Take 1 tablet every day by oral route., Disp: , Rfl:     Methotrexate, PF, (RASUVO) 12.5 MG/0.25ML autoinjector, 0.25 mLs (Patient not taking: Reported on 6/24/2024), Disp: , Rfl:   ALLERGIES:    Allergies   Allergen Reactions    Bee Venom Hives and Rash     SOCIAL HISTORY:   Social History     Socioeconomic History    Marital status:      Spouse name: Not on file    Number of children: Not on file    Years of education: Not on file    Highest education level: Not on file   Occupational History    Not on file   Tobacco Use    Smoking status: Former     Types: Cigarettes    Smokeless tobacco: Former   Substance and Sexual Activity    Alcohol use: Not on file    Drug use: Not on file    Sexual activity: Not on file   Other Topics Concern    Not on file   Social History Narrative    Not on file     Social Determinants of Health     Financial Resource Strain: Low Risk  (5/8/2023)    [facility], [facility]    Financial Resource Strain     Difficulty of Paying Living Expenses: 3     Difficulty of Paying Living Expenses: Not on file   Food Insecurity: No Food Insecurity (5/8/2023)    [facility], [facility]    Food Insecurity     Worried About Running Out of Food in the Last Year: 1   Transportation Needs: No Transportation Needs (5/8/2023)    [email or web address], [facility]    Transportation Needs     Lack of Transportation (Medical): 1   Physical Activity: Inactive (8/27/2020)    Received from [facility]    Exercise Vital Sign     Days of Exercise per Week: 0 days     Minutes of Exercise per Session: 30 min   Stress: No Stress Concern Present (8/27/2020)    Received from [facility]    [place] [employer] - Occupational Stress Questionnaire     Feeling of Stress : Only a little   Social Connections: Unknown (5/8/2024)    [facility]    Social Connections     Frequency of Communication with Friends and Family: Not on file   Interpersonal Safety: Not on file   Housing Stability: Low Risk  (5/8/2023)    [facility], [facility]    Housing Stability     Unable to Pay for Housing in the Last Year: 1     FAMILY HISTORY: History reviewed. No pertinent family history.  EXAM:Vitals: /58 (BP Location: Left arm, Patient Position: Sitting, Cuff Size: Adult Regular)   Ht 1.765 m (5' 9.5\")   Wt 85.3 kg (188 lb)   BMI 27.36 kg/m    BMI= Body mass index is 27.36 kg/m .    General appearance: Patient is alert and fully cooperative with history & exam.  No sign of distress is noted during the visit.     Psychiatric: Affect is pleasant & appropriate.  Patient appears motivated to improve health.     Respiratory: Breathing is regular & unlabored while sitting.     HEENT: Hearing is intact to spoken word.  Speech is clear.  No gross evidence of visual impairment that would impact ambulation.       Vascular: DP 1/4 & PT 0/4 left & right.  CFT delayed with " dependent rubor noted about the digits.  Diminished hair growth distal to mid tibia and no hair about the foot and toes.  Temperature changes noted, warm to cool proximal to distal.  Hemosiderin pigmentation noted with multiple varicosities legs and feet bilateral. Generalized edema bilateral legs and feet.  Pt denies claudication history.     Neurologic: Normal plantar response bilateral.  Diminished protective threshold plus 0/10 applications of a 5.07 monofilament.  Pt admits  burning and paraesthesias about the feet and toes with palpation.     Dermatologic: All 10 nails are dystrophic but in reasonable repair diminished texture turgor and tone about the integument.  Skin is thin & shiny.  Full-thickness ulceration is noted about the right first metatarsal head with some hyperkeratosis on the fifth metatarsal head as well.  Upon debridement moist bloody drainage is noted with subcutaneous tissue measuring about 2 mm.     Musculoskeletal: Patient is ambulatory without assistive device or brace.  There is semi reducible contracture of the lesser digits.  Subtle discomfort is noted about the lateral ankle sinus tarsi peroneal tendons fifth metatarsal base.  No palpable localized edema.  No erythema or heat.  Mildly reduced range of motion through the ankle and subtalar joint.    Creatinine (mg/dL)   Date Value   12/07/2022 1.07   11/23/2022 1.32 (H)   11/20/2022 1.36 (H)     ASSESSMENT:       ICD-10-CM    1. 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 Type 2 diabetes mellitus with foot ulcer, with long-term current use of insulin (H)  E11.621 Orthotics, Mastectomy and Custom Compression Orders    L97.509 CANCELED: Orthotics, Mastectomy and Custom Compression Orders    Z79.4           PLAN:    8/30/2023  Recommend appropriate shoe gear to provide stability about the lateral column  peroneal tendons.  Just got DM shoes within this year.  Discussed the importance of appropriate shoe gear.  May modify the shoe if they are not working properly or fitting well.  All questions were answered  Follow-up if this remains symptomatic otherwise once yearly for diabetic foot exam.    6/24/2024  I debrided the ulceration plantar right first metatarsal head to and including subcutaneous tissue with a 15 blade scalpel.  Order for DM shoes at [place] to be able to offload first and fifth met heads.  Sterile dressing applied  RTC 1-2 months to confirm the ulceration is resolved  Due to neuropathy recommend staying in diabetic shoes only.      Darrian Degroot DPM

## 2024-06-24 NOTE — LETTER
6/24/2024      Corwin Fontaine  [address] [address] [address]      Dear Colleague,    Thank you for referring your patient, Corwin Fontaine, to the [facility]. Please see a copy of my visit note below.    Chief Complaint   Patient presents with     Diabetes     Type 2     RECHECK     Callus Left and Right 1st and 5th metatarsal head; LOV 8/30/2023     Other     Presents with his daughter 6/24/2024     HPI:  Corwin Fontaine is a 88 year old male who is seen in consultation at the request of self.    Pt presents for eval of:   (Onset, Location, L/R, Character, Treatments, Injury if yes)    DM type 2    Warfarin    Stroke 2020, left side     Ongoing for 1 year, lateral Left ankle pain that is more constant June 2023. Presents with his youngest daughter.  Pain in lateral malleolus left and under right 5th met head.  Hx of ulcers on both feet.     Retired.      Review of Systems:  Patient denies fever, chills, rash, wound, stiffness, limping, numbness, weakness, heart burn, blood in stool, chest pain with activity, calf pain when walking, shortness of breath with activity, chronic cough, easy bleeding/bruising, swelling of ankles, excessive thirst, fatigue, depression, anxiety.  Patient admits only to symptoms noted in history.     There is no problem list on file for this patient.    PAST MEDICAL HISTORY: History reviewed. No pertinent past medical history.  PAST SURGICAL HISTORY: History reviewed. No pertinent surgical history.  MEDICATIONS:   Current Outpatient Medications:      alendronate (FOSAMAX) 35 MG tablet, Take 1 tablet every week by oral route., Disp: , Rfl:      Continuous Blood Gluc Sensor (DEXCOM G6 SENSOR) MISC, TO BE USED TO READ BLOOD SUGARS, FOLLOW  DIRECTIONS. CHANGE SENSOR EVERY 10 DAYS., Disp: , Rfl:      famotidine (PEPCID) 40 MG tablet, Take 40 mg by mouth daily, Disp: , Rfl:      ferrous sulfate (FEROSUL) 325 (65 Fe) MG tablet, Take 325 mg by  mouth, Disp: , Rfl:      furosemide (LASIX) 20 MG tablet, Take 1 tablet by mouth every morning, Disp: , Rfl:      Insulin Aspart FlexPen 100 UNIT/ML SOPN, Inject 6 Units Subcutaneous, Disp: , Rfl:      insulin lispro (HUMALOG VIAL) 100 UNIT/ML vial, , Disp: , Rfl:      metoprolol tartrate (LOPRESSOR) 25 MG tablet, TAKE 1 TABLET TWICE A DAY (REPLACES PREVIOUS PRESCRIPTION), Disp: , Rfl:      potassium chloride ER (K-TAB/KLOR-CON) 10 MEQ CR tablet, Take 1 tablet by mouth daily with food, Disp: , Rfl:      predniSONE (DELTASONE) 1 MG tablet, Take 3 mg by mouth, Disp: , Rfl:      Vitamin D3 (VITAMIN D-1000 MAX ST) 25 mcg (1000 units) tablet, Take 1,000 Units by mouth daily, Disp: , Rfl:      warfarin ANTICOAGULANT (COUMADIN) 3 MG tablet, Take by mouth 6 mg (3 mg x 2) every Tue, Sat; 4.5 mg (3 mg x 1.5) all other days, Disp: , Rfl:      Ferrous Sulfate 5 MG/20ML LIQD, ferrous sulfate, Disp: , Rfl:      folic acid (FOLVITE) 1 MG tablet, Take 1 tablet every day by oral route., Disp: , Rfl:      Methotrexate, PF, (RASUVO) 12.5 MG/0.25ML autoinjector, 0.25 mLs (Patient not taking: Reported on 6/24/2024), Disp: , Rfl:   ALLERGIES:    Allergies   Allergen Reactions     Bee Venom Hives and Rash     SOCIAL HISTORY:   Social History     Socioeconomic History     Marital status:      Spouse name: Not on file     Number of children: Not on file     Years of education: Not on file     Highest education level: Not on file   Occupational History     Not on file   Tobacco Use     Smoking status: Former     Types: Cigarettes     Smokeless tobacco: Former   Substance and Sexual Activity     Alcohol use: Not on file     Drug use: Not on file     Sexual activity: Not on file   Other Topics Concern     Not on file   Social History Narrative     Not on file     Social Determinants of Health     Financial Resource Strain: Low Risk  (5/8/2023)    [facility] [employer] [facility]    Financial Resource Strain      Difficulty of Paying Living Expenses: 3      Difficulty of Paying Living Expenses: Not on file   Food Insecurity: No Food Insecurity (5/8/2023)    Received from [facility], [facility]    Food Insecurity      Worried About Running Out of Food in the Last Year: 1   Transportation Needs: No Transportation Needs (5/8/2023)    Received from [facility], [facility]    Transportation Needs      Lack of Transportation (Medical): 1   Physical Activity: Inactive (8/27/2020)    Received from [facility]    Exercise Vital Sign      Days of Exercise per Week: 0 days      Minutes of Exercise per Session: 30 min   Stress: No Stress Concern Present (8/27/2020)    Received from [facility]    [place] of Occupational Health - Occupational Stress Questionnaire      Feeling of Stress : Only a little   Social Connections: Unknown (5/8/2024)    Received from [facility]    Social Connections      Frequency of Communication with Friends and Family: Not on file   Interpersonal Safety: Not on file   Housing Stability: Low Risk  (5/8/2023)    Received from [facility], [facility]    Housing Stability      Unable to Pay for Housing in the Last Year: 1     FAMILY HISTORY: History reviewed. No pertinent family history.  EXAM:Vitals: /58 (BP Location: Left arm, Patient Position: Sitting, Cuff Size: Adult Regular)   Ht 1.765 m (5' 9.5\")   Wt 85.3 kg (188 lb)   BMI 27.36 kg/m    BMI= Body mass index is 27.36 kg/m .    General appearance: Patient is alert and fully cooperative with history & exam.  No sign of distress is noted during the visit.     Psychiatric: Affect is pleasant & appropriate.  Patient appears " motivated to improve health.     Respiratory: Breathing is regular & unlabored while sitting.     HEENT: Hearing is intact to spoken word.  Speech is clear.  No gross evidence of visual impairment that would impact ambulation.       Vascular: DP 1/4 & PT 0/4 left & right.  CFT delayed with dependent rubor noted about the digits.  Diminished hair growth distal to mid tibia and no hair about the foot and toes.  Temperature changes noted, warm to cool proximal to distal.  Hemosiderin pigmentation noted with multiple varicosities legs and feet bilateral. Generalized edema bilateral legs and feet.  Pt denies claudication history.     Neurologic: Normal plantar response bilateral.  Diminished protective threshold plus 0/10 applications of a 5.07 monofilament.  Pt admits  burning and paraesthesias about the feet and toes with palpation.     Dermatologic: All 10 nails are dystrophic but in reasonable repair diminished texture turgor and tone about the integument.  Skin is thin & shiny.  Full-thickness ulceration is noted about the right first metatarsal head with some hyperkeratosis on the fifth metatarsal head as well.  Upon debridement moist bloody drainage is noted with subcutaneous tissue measuring about 2 mm.     Musculoskeletal: Patient is ambulatory without assistive device or brace.  There is semi reducible contracture of the lesser digits.  Subtle discomfort is noted about the lateral ankle sinus tarsi peroneal tendons fifth metatarsal base.  No palpable localized edema.  No erythema or heat.  Mildly reduced range of motion through the ankle and subtalar joint.    Creatinine (mg/dL)   Date Value   12/07/2022 1.07   11/23/2022 1.32 (H)   11/20/2022 1.36 (H)     ASSESSMENT:       ICD-10-CM    1. 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 Type 2 diabetes mellitus with foot ulcer,  with long-term current use of insulin (H)  E11.621 Orthotics, Mastectomy and Custom Compression Orders    L97.509 CANCELED: Orthotics, Mastectomy and Custom Compression Orders    Z79.4           PLAN:    8/30/2023  Recommend appropriate shoe gear to provide stability about the lateral column peroneal tendons.  Just got DM shoes within this year.  Discussed the importance of appropriate shoe gear.  May modify the shoe if they are not working properly or fitting well.  All questions were answered  Follow-up if this remains symptomatic otherwise once yearly for diabetic foot exam.    6/24/2024  I debrided the ulceration plantar right first metatarsal head to and including subcutaneous tissue with a 15 blade scalpel.  Order for DM shoes at [place] to be able to offload first and fifth met heads.  Sterile dressing applied  RTC 1-2 months to confirm the ulceration is resolved  Due to neuropathy recommend staying in diabetic shoes only.      Darrian Degroot DPM                Again, thank you for allowing me to participate in the care of your patient.        Sincerely,        Darrian Degroot DPM

## 2024-08-21 ENCOUNTER — OFFICE VISIT (OUTPATIENT)
Dept: PODIATRY | Facility: OTHER | Age: 89
End: 2024-08-21
Payer: COMMERCIAL

## 2024-08-21 VITALS
DIASTOLIC BLOOD PRESSURE: 78 MMHG | HEIGHT: 70 IN | HEART RATE: 75 BPM | SYSTOLIC BLOOD PRESSURE: 129 MMHG | WEIGHT: 190 LBS | BODY MASS INDEX: 27.2 KG/M2 | TEMPERATURE: 97.6 F

## 2024-08-21 DIAGNOSIS — Z79.4 TYPE 2 DIABETES MELLITUS WITH FOOT ULCER, WITH LONG-TERM CURRENT USE OF INSULIN (H): ICD-10-CM

## 2024-08-21 DIAGNOSIS — M15.0 PRIMARY OSTEOARTHRITIS INVOLVING MULTIPLE JOINTS: ICD-10-CM

## 2024-08-21 DIAGNOSIS — L97.509 TYPE 2 DIABETES MELLITUS WITH FOOT ULCER, WITH LONG-TERM CURRENT USE OF INSULIN (H): ICD-10-CM

## 2024-08-21 DIAGNOSIS — E11.40 CONTROLLED TYPE 2 DIABETES WITH NEUROPATHY (H): Primary | ICD-10-CM

## 2024-08-21 DIAGNOSIS — E11.621 TYPE 2 DIABETES MELLITUS WITH FOOT ULCER, WITH LONG-TERM CURRENT USE OF INSULIN (H): ICD-10-CM

## 2024-08-21 PROCEDURE — 99213 OFFICE O/P EST LOW 20 MIN: CPT | Performed by: PODIATRIST

## 2024-08-21 ASSESSMENT — PAIN SCALES - GENERAL: PAINLEVEL: SEVERE PAIN (6)

## 2024-08-21 NOTE — PROGRESS NOTES
Chief Complaint   Patient presents with    WOUND CARE     Ulcer plantar Right 1st metatarsal head, onset June 2024; LOV 6/24/2024  Callus Left and Right 1st and 5th metatarsal head; LOV 6/24/2024    Diabetes     Type 2, picking up DM shoes 8/28/2024 on his 89th bday    Other     Presents with his daughter 8/21/2024  pacemaker     HPI:  Corwin Fontaine is a 88 year old male who is seen in consultation at the request of self.    Pt presents for eval of:   (Onset, Location, L/R, Character, Treatments, Injury if yes)    DM type 2    Warfarin    Stroke 2020, left side     Ongoing for 1 year, lateral Left ankle pain that is more constant June 2023. Presents with his youngest daughter.  Pain in lateral malleolus left and under right 5th met head.  Hx of ulcers on both feet.     Retired.    Review of Systems:  Patient denies fever, chills, rash, wound, stiffness, limping, numbness, weakness, heart burn, blood in stool, chest pain with activity, calf pain when walking, shortness of breath with activity, chronic cough, easy bleeding/bruising, swelling of ankles, excessive thirst, fatigue, depression, anxiety.  Patient admits only to symptoms noted in history.     There is no problem list on file for this patient.    PAST MEDICAL HISTORY: History reviewed. No pertinent past medical history.  PAST SURGICAL HISTORY: History reviewed. No pertinent surgical history.  MEDICATIONS:   Current Outpatient Medications:     alendronate (FOSAMAX) 35 MG tablet, Take 1 tablet every week by oral route., Disp: , Rfl:     Continuous Blood Gluc Sensor (DEXCOM G6 SENSOR) MISC, TO BE USED TO READ BLOOD SUGARS, FOLLOW  DIRECTIONS. CHANGE SENSOR EVERY 10 DAYS., Disp: , Rfl:     famotidine (PEPCID) 40 MG tablet, Take 40 mg by mouth daily, Disp: , Rfl:     ferrous sulfate (FEROSUL) 325 (65 Fe) MG tablet, Take 325 mg by mouth, Disp: , Rfl:     Ferrous Sulfate 5 MG/20ML LIQD, ferrous sulfate, Disp: , Rfl:     folic acid (FOLVITE) 1 MG  tablet, Take 1 tablet every day by oral route., Disp: , Rfl:     furosemide (LASIX) 20 MG tablet, Take 1 tablet by mouth every morning, Disp: , Rfl:     Insulin Aspart FlexPen 100 UNIT/ML SOPN, Inject 6 Units Subcutaneous, Disp: , Rfl:     insulin lispro (HUMALOG VIAL) 100 UNIT/ML vial, , Disp: , Rfl:     metoprolol tartrate (LOPRESSOR) 25 MG tablet, TAKE 1 TABLET TWICE A DAY (REPLACES PREVIOUS PRESCRIPTION), Disp: , Rfl:     potassium chloride ER (K-TAB/KLOR-CON) 10 MEQ CR tablet, Take 1 tablet by mouth daily with food, Disp: , Rfl:     predniSONE (DELTASONE) 1 MG tablet, Take 3 mg by mouth, Disp: , Rfl:     Vitamin D3 (VITAMIN D-1000 MAX ST) 25 mcg (1000 units) tablet, Take 1,000 Units by mouth daily, Disp: , Rfl:     warfarin ANTICOAGULANT (COUMADIN) 3 MG tablet, Take by mouth 6 mg (3 mg x 2) every Tue, Sat; 4.5 mg (3 mg x 1.5) all other days, Disp: , Rfl:     Methotrexate, PF, (RASUVO) 12.5 MG/0.25ML autoinjector, 0.25 mLs (Patient not taking: Reported on 6/24/2024), Disp: , Rfl:   ALLERGIES:    Allergies   Allergen Reactions    Bee Venom Hives and Rash     SOCIAL HISTORY:   Social History     Socioeconomic History    Marital status:      Spouse name: Not on file    Number of children: Not on file    Years of education: Not on file    Highest education level: Not on file   Occupational History    Not on file   Tobacco Use    Smoking status: Former     Types: Cigarettes    Smokeless tobacco: Former   Substance and Sexual Activity    Alcohol use: Not on file    Drug use: Not on file    Sexual activity: Not on file   Other Topics Concern    Not on file   Social History Narrative    Not on file     Social Determinants of Health     Financial Resource Strain: Low Risk  (5/8/2023)    [facility]    Financial Resource Strain     Difficulty of Paying Living Expenses: 3     Difficulty of Paying Living Expenses: Not on file  "  Food Insecurity: No Food Insecurity (5/8/2023)    Received from [facility], [facility]    Food Insecurity     Worried About Running Out of Food in the Last Year: 1   Transportation Needs: No Transportation Needs (5/8/2023)    Received from [facility], [facility]    Transportation Needs     Lack of Transportation (Medical): 1   Physical Activity: Inactive (8/27/2020)    Received from [facility]    Exercise Vital Sign     Days of Exercise per Week: 0 days     Minutes of Exercise per Session: 30 min   Stress: No Stress Concern Present (8/27/2020)    Received from [facility]    [place] [facility] [employer] - Occupational Stress Questionnaire     Feeling of Stress : Only a little   Social Connections: Unknown (5/8/2024)    Received from [facility] [place]    Social Connections     Frequency of Communication with Friends and Family: Not on file   Interpersonal Safety: Not on file   Housing Stability: Low Risk  (5/8/2023)    Received from [facility], [facility]    Housing Stability     Unable to Pay for Housing in the Last Year: 1     FAMILY HISTORY: History reviewed. No pertinent family history.  EXAM:Vitals: /78 (BP Location: Right arm, Patient Position: Sitting, Cuff Size: Adult Regular)   Pulse 75   Temp 97.6  F (36.4  C) (Temporal)   Ht 1.765 m (5' 9.5\")   Wt 86.2 kg (190 lb)   BMI 27.66 kg/m    BMI= Body mass index is 27.66 kg/m .    General appearance: Patient is alert and fully cooperative with history & exam.  No sign of distress is noted during the visit.     Psychiatric: Affect is pleasant & appropriate.  Patient appears motivated to improve health.     Respiratory: Breathing is regular & unlabored while sitting.     HEENT: Hearing is " intact to spoken word.  Speech is clear.  No gross evidence of visual impairment that would impact ambulation.       Vascular: DP 1/4 & PT 0/4 left & right.  CFT delayed with dependent rubor noted about the digits.  Diminished hair growth distal to mid tibia and no hair about the foot and toes.  Temperature changes noted, warm to cool proximal to distal.  Hemosiderin pigmentation noted with multiple varicosities legs and feet bilateral. Generalized edema bilateral legs and feet.  Pt denies claudication history.     Neurologic: Normal plantar response bilateral.  Diminished protective threshold plus 0/10 applications of a 5.07 monofilament.  Pt admits  burning and paraesthesias about the feet and toes with palpation.     Dermatologic: All 10 nails are dystrophic but in reasonable repair diminished texture turgor and tone about the integument.  Skin is thin & shiny.  Previous ulceration plantar right first metatarsal head is now closed with hyperkeratosis.     Musculoskeletal: Patient is ambulatory without assistive device or brace.  There is semi reducible contracture of the lesser digits.  Subtle discomfort is noted about the lateral ankle sinus tarsi peroneal tendons fifth metatarsal base.  No palpable localized edema.  No erythema or heat.  Mildly reduced range of motion through the ankle and subtalar joint.    Creatinine (mg/dL)   Date Value   12/07/2022 1.07   11/23/2022 1.32 (H)   11/20/2022 1.36 (H)     ASSESSMENT:       ICD-10-CM    1. 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 Type 2 diabetes mellitus with foot ulcer, with long-term current use of insulin (H)  E11.621     L97.509     Z79.4       3. Primary osteoarthritis involving multiple joints  M15.9           PLAN:    8/30/2023  Recommend appropriate shoe gear to provide stability about the lateral column peroneal tendons.  Just got DM shoes within this year.  Discussed the importance of appropriate shoe gear.  May modify the shoe if  they are not working properly or fitting well.  All questions were answered  Follow-up if this remains symptomatic otherwise once yearly for diabetic foot exam.    6/24/2024  I debrided the ulceration plantar right first metatarsal head to and including subcutaneous tissue with a 15 blade scalpel.  Order for DM shoes at [place] to be able to offload first and fifth met heads.  Sterile dressing applied  RTC 1-2 months to confirm the ulceration is resolved  Due to neuropathy recommend staying in diabetic shoes only.    8/21/2024  Gets DM shoes in 8/28/24  So follow up here in 6 weeks and then probably 9 months so that we can keep Dm shoes in good repair every 12 months       Darrian Degroot DPM

## 2024-08-21 NOTE — PATIENT INSTRUCTIONS
"Nail Debridement    A high quality instrument makes trimming toenails MUCH easier.  Search [place] for any 5\" nail nipper manufactured by reliable brands such as Miltex, Integra or Jarit as these quality instruments will help manage difficult nails more effectively and comfortably. We use Miltex -SS.  A physician is not necessary to trim nails even if you are taking blood thinners or are diabetic.  Your family or care givers may help manage your toenails.      Trim or sand the nails once weekly.  Do not wait until they are long and painful or trimming will become too difficult and painful and will increase your risk of complications or infection.  A course file or 120 grit sandpaper on a sanding block can be helpful.  For very thick nails many people prefer battery operated smith such as an Amope', Personal Pedi and Emjoi for regular use or heavy painful callouses or thick toenails.    Trim or skive any portion of nail that is thick, loose, crumbling, or not well attached. Do not tear the nail away, but rather cut them with a nail nipperor sand or sand them down.  You may follow up with your Podiatric Physician if you have pain, bleeding, infection, questions or other concerns.      You may also contact the following Registered Nurses for further help with nail debridement and minor hygiene concnerns.  They may come to your home or meet them at their clinic to trim your toenails and soak your feet, as well as monitor for any complications that would require evaluation by a Physician.      [facility]  Laurel Jimenez RN  Phone & text [phone]    [facility]  Adina Morris RN  Office [phone]    [facility]  [phone]  [email or web address]    For up to date list and to find foot care nurses in other communities visit American Foot [employer] Nurses Association website:  afcna.org.     Calluses, Corns, IPKs, Porokeratosis    When there is " excessive friction or pressure on the skin, the body responds by making the skin thicker.  While this may protect the deeper structures, the thickened skin can take up more space and thus increase pressure over a bony prominence or become an open sore or skin ulcer as this skin becomes less flexible.    Flat, diffuse thickening are simple calluses and they are usually caused by friction.  Often these are the result of rubbing on a shoe or going barefoot.    Calluses with a central core between the toes are called corns.  These often result from prominent joints on adjacent toes rubbing together.  Theses are often a symptom of bone malalignment and will usually recur unless the underlying bones are addressed.    Many of these lesions can be kept comfortable with routine maintenance. This consists of filing them with a Ped Egg, callus file, or 120 grit sandpaper on a block, every day during your bath or shower.  Most people prefer battery operated smith such as an Amope', Personal Pedi and Emjoi for regular use or heavy painful callouses.  Heavy creams or ointments can be applied 1-2 times every day to keep them soft. Toe spacers can be used for corns, gel pads can be used for other lesions on the bottom of the foot. If there is a deformity noted, such as a prominent bone, often this can be addressed to minimize recurrence. However, sometimes the pressure and lesion simply migrates to another spot after surgery, so it is not a guaranteed cure.     If you have severe callouses and cracking, you may apply heavy ointments that you scoop up such as Cetaphil cream, Eucerin, Aquaphor or Vaseline.  Be sure to obtain cream or ointment in these brands and not lotion (lotion is water based and not durable enough for feet). For more aggressive help apply heavy creams or ointment under occlusive dressings such as Saran Wrap or Jelly Feet while sleeping.   Jelly Feet can be obtained at [email or web address].     To be successful  with managing hyperkeratotic skin, you must manage hygiene daily.  Apply the cream once or twice EVERY day.  At your bath or shower time is the easiest time to work on this when skin is most soft.  There is no medical or surgical treatment that will absolutely eliminate many of these symptoms.      Pedifix is a reliable source for all sorts of foot pads, cushions, or interdigital spacers and foot appliances. Go to www.pedifix."[email or web address] or request a catalog at 1-379-myDocket.        Please call with any additional questions.

## 2024-08-21 NOTE — LETTER
8/21/2024      Corwin Fontaine  [address] [address] [address]      Dear Colleague,    Thank you for referring your patient, Corwin Fontaine, to the [facility]. Please see a copy of my visit note below.    Chief Complaint   Patient presents with     WOUND CARE     Ulcer plantar Right 1st metatarsal head, onset June 2024; LOV 6/24/2024  Callus Left and Right 1st and 5th metatarsal head; LOV 6/24/2024     Diabetes     Type 2, picking up DM shoes 8/28/2024 on his 89th bday     Other     Presents with his daughter 8/21/2024  pacemaker     HPI:  Corwin Fontaine is a 88 year old male who is seen in consultation at the request of self.    Pt presents for eval of:   (Onset, Location, L/R, Character, Treatments, Injury if yes)    DM type 2    Warfarin    Stroke 2020, left side     Ongoing for 1 year, lateral Left ankle pain that is more constant June 2023. Presents with his youngest daughter.  Pain in lateral malleolus left and under right 5th met head.  Hx of ulcers on both feet.     Retired.    Review of Systems:  Patient denies fever, chills, rash, wound, stiffness, limping, numbness, weakness, heart burn, blood in stool, chest pain with activity, calf pain when walking, shortness of breath with activity, chronic cough, easy bleeding/bruising, swelling of ankles, excessive thirst, fatigue, depression, anxiety.  Patient admits only to symptoms noted in history.     There is no problem list on file for this patient.    PAST MEDICAL HISTORY: History reviewed. No pertinent past medical history.  PAST SURGICAL HISTORY: History reviewed. No pertinent surgical history.  MEDICATIONS:   Current Outpatient Medications:      alendronate (FOSAMAX) 35 MG tablet, Take 1 tablet every week by oral route., Disp: , Rfl:      Continuous Blood Gluc Sensor (DEXCOM G6 SENSOR) MISC, TO BE USED TO READ BLOOD SUGARS, FOLLOW  DIRECTIONS. CHANGE SENSOR EVERY 10 DAYS., Disp: , Rfl:      famotidine  (PEPCID) 40 MG tablet, Take 40 mg by mouth daily, Disp: , Rfl:      ferrous sulfate (FEROSUL) 325 (65 Fe) MG tablet, Take 325 mg by mouth, Disp: , Rfl:      Ferrous Sulfate 5 MG/20ML LIQD, ferrous sulfate, Disp: , Rfl:      folic acid (FOLVITE) 1 MG tablet, Take 1 tablet every day by oral route., Disp: , Rfl:      furosemide (LASIX) 20 MG tablet, Take 1 tablet by mouth every morning, Disp: , Rfl:      Insulin Aspart FlexPen 100 UNIT/ML SOPN, Inject 6 Units Subcutaneous, Disp: , Rfl:      insulin lispro (HUMALOG VIAL) 100 UNIT/ML vial, , Disp: , Rfl:      metoprolol tartrate (LOPRESSOR) 25 MG tablet, TAKE 1 TABLET TWICE A DAY (REPLACES PREVIOUS PRESCRIPTION), Disp: , Rfl:      potassium chloride ER (K-TAB/KLOR-CON) 10 MEQ CR tablet, Take 1 tablet by mouth daily with food, Disp: , Rfl:      predniSONE (DELTASONE) 1 MG tablet, Take 3 mg by mouth, Disp: , Rfl:      Vitamin D3 (VITAMIN D-1000 MAX ST) 25 mcg (1000 units) tablet, Take 1,000 Units by mouth daily, Disp: , Rfl:      warfarin ANTICOAGULANT (COUMADIN) 3 MG tablet, Take by mouth 6 mg (3 mg x 2) every Tue, Sat; 4.5 mg (3 mg x 1.5) all other days, Disp: , Rfl:      Methotrexate, PF, (RASUVO) 12.5 MG/0.25ML autoinjector, 0.25 mLs (Patient not taking: Reported on 6/24/2024), Disp: , Rfl:   ALLERGIES:    Allergies   Allergen Reactions     Bee Venom Hives and Rash     SOCIAL HISTORY:   Social History     Socioeconomic History     Marital status:      Spouse name: Not on file     Number of children: Not on file     Years of education: Not on file     Highest education level: Not on file   Occupational History     Not on file   Tobacco Use     Smoking status: Former     Types: Cigarettes     Smokeless tobacco: Former   Substance and Sexual Activity     Alcohol use: Not on file     Drug use: Not on file     Sexual activity: Not on file   Other Topics Concern     Not on file   Social History Narrative     Not on file     Social Determinants of Health     Financial  "Resource Strain: Low Risk  (5/8/2023)    Received from [facility], [facility]    Financial Resource Strain      Difficulty of Paying Living Expenses: 3      Difficulty of Paying Living Expenses: Not on file   Food Insecurity: No Food Insecurity (5/8/2023)    Received from [facility], [facility]    Food Insecurity      Worried About Running Out of Food in the Last Year: 1   Transportation Needs: No Transportation Needs (5/8/2023)    Received from [facility], [facility]    Transportation Needs      Lack of Transportation (Medical): 1   Physical Activity: Inactive (8/27/2020)    Received from [facility]    Exercise Vital Sign      Days of Exercise per Week: 0 days      Minutes of Exercise per Session: 30 min   Stress: No Stress Concern Present (8/27/2020)    Received from [facility]    [place] [employer]ational [employer] - Occupational Stress Questionnaire      Feeling of Stress : Only a little   Social Connections: Unknown (5/8/2024)    Received from [facility]    Social Connections      Frequency of Communication with Friends and Family: Not on file   Interpersonal Safety: Not on file   Housing Stability: Low Risk  (5/8/2023)    Received from [facility], [facility]    Housing Stability      Unable to Pay for Housing in the Last Year: 1     FAMILY HISTORY: History reviewed. No pertinent family history.  EXAM:Vitals: /78 (BP Location: Right arm, Patient Position: Sitting, Cuff Size: Adult Regular)   Pulse 75   Temp 97.6  F (36.4  C) (Temporal)   Ht 1.765 m (5' 9.5\")   Wt 86.2 kg (190 lb)   BMI 27.66 kg/m    BMI= Body mass index is 27.66 kg/m .    General appearance: Patient " is alert and fully cooperative with history & exam.  No sign of distress is noted during the visit.     Psychiatric: Affect is pleasant & appropriate.  Patient appears motivated to improve health.     Respiratory: Breathing is regular & unlabored while sitting.     HEENT: Hearing is intact to spoken word.  Speech is clear.  No gross evidence of visual impairment that would impact ambulation.       Vascular: DP 1/4 & PT 0/4 left & right.  CFT delayed with dependent rubor noted about the digits.  Diminished hair growth distal to mid tibia and no hair about the foot and toes.  Temperature changes noted, warm to cool proximal to distal.  Hemosiderin pigmentation noted with multiple varicosities legs and feet bilateral. Generalized edema bilateral legs and feet.  Pt denies claudication history.     Neurologic: Normal plantar response bilateral.  Diminished protective threshold plus 0/10 applications of a 5.07 monofilament.  Pt admits  burning and paraesthesias about the feet and toes with palpation.     Dermatologic: All 10 nails are dystrophic but in reasonable repair diminished texture turgor and tone about the integument.  Skin is thin & shiny.  Previous ulceration plantar right first metatarsal head is now closed with hyperkeratosis.     Musculoskeletal: Patient is ambulatory without assistive device or brace.  There is semi reducible contracture of the lesser digits.  Subtle discomfort is noted about the lateral ankle sinus tarsi peroneal tendons fifth metatarsal base.  No palpable localized edema.  No erythema or heat.  Mildly reduced range of motion through the ankle and subtalar joint.    Creatinine (mg/dL)   Date Value   12/07/2022 1.07   11/23/2022 1.32 (H)   11/20/2022 1.36 (H)     ASSESSMENT:       ICD-10-CM    1. 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 Type 2 diabetes mellitus with foot ulcer, with long-term current use of insulin (H)  E11.621     L97.509     Z79.4       3. Primary  osteoarthritis involving multiple joints  M15.9           PLAN:    8/30/2023  Recommend appropriate shoe gear to provide stability about the lateral column peroneal tendons.  Just got DM shoes within this year.  Discussed the importance of appropriate shoe gear.  May modify the shoe if they are not working properly or fitting well.  All questions were answered  Follow-up if this remains symptomatic otherwise once yearly for diabetic foot exam.    6/24/2024  I debrided the ulceration plantar right first metatarsal head to and including subcutaneous tissue with a 15 blade scalpel.  Order for DM shoes at [place] to be able to offload first and fifth met heads.  Sterile dressing applied  RTC 1-2 months to confirm the ulceration is resolved  Due to neuropathy recommend staying in diabetic shoes only.    8/21/2024  Gets DM shoes in 8/28/24  So follow up here in 6 weeks and then probably 9 months so that we can keep Dm shoes in good repair every 12 months       Darrian Degroot DPM                Again, thank you for allowing me to participate in the care of your patient.        Sincerely,        Darrian Degroot DPM

## 2024-10-02 ENCOUNTER — OFFICE VISIT (OUTPATIENT)
Dept: PODIATRY | Facility: CLINIC | Age: 89
End: 2024-10-02
Payer: COMMERCIAL

## 2024-10-02 VITALS
DIASTOLIC BLOOD PRESSURE: 80 MMHG | BODY MASS INDEX: 27.49 KG/M2 | WEIGHT: 192 LBS | TEMPERATURE: 97.5 F | SYSTOLIC BLOOD PRESSURE: 128 MMHG | HEIGHT: 70 IN

## 2024-10-02 DIAGNOSIS — E11.40 CONTROLLED TYPE 2 DIABETES WITH NEUROPATHY (H): Primary | ICD-10-CM

## 2024-10-02 DIAGNOSIS — M20.5X1 HALLUX LIMITUS OF RIGHT FOOT: ICD-10-CM

## 2024-10-02 DIAGNOSIS — Z86.31 HISTORY OF DIABETIC ULCER OF FOOT: ICD-10-CM

## 2024-10-02 PROCEDURE — 99213 OFFICE O/P EST LOW 20 MIN: CPT | Performed by: PODIATRIST

## 2024-10-02 ASSESSMENT — PAIN SCALES - GENERAL: PAINLEVEL: NO PAIN (0)

## 2024-10-02 NOTE — LETTER
10/2/2024      Corwin Fontaine  [address] [facility] [address]      Dear Colleague,    Thank you for referring your patient, Corwin Fontaine, to the [facility]. Please see a copy of my visit note below.    Chief Complaint   Patient presents with     WOUND CARE     Ulcer plantar Right 1st metatarsal head, onset June 2024; LOV 8/21/2024  Callus Left and Right 1st and 5th metatarsal head; LOV 6/24/2024     Diabetes     Type 2, picking up DM shoes 8/28/2024 on his 89th bday     Other     Presents with his daughter 8/21/2024  Pacemaker  Warfarin     HPI:  Corwin Fontaine is a 88 year old male who is seen in consultation at the request of self.    Pt presents for eval of:   (Onset, Location, L/R, Character, Treatments, Injury if yes)    DM type 2    Warfarin    Stroke 2020, left side     Ongoing for 1 year, lateral Left ankle pain that is more constant June 2023. Presents with his youngest daughter.  Pain in lateral malleolus left and under right 5th met head.  Hx of ulcers on both feet.     Retired.    Since last visit he has obtained new diabetic shoes and has had difficulty fitting them.  His feet were sliding around and he went back to the orthotist a couple of times to modify and adjust them and he thinks he might have them figured out but he still has some dark spots on the bottom ball of his foot.    Review of Systems:  Patient denies fever, chills, rash, wound, stiffness, limping, numbness, weakness, heart burn, blood in stool, chest pain with activity, calf pain when walking, shortness of breath with activity, chronic cough, easy bleeding/bruising, swelling of ankles, excessive thirst, fatigue, depression, anxiety.  Patient admits only to symptoms noted in history.     There is no problem list on file for this patient.    PAST MEDICAL HISTORY: History reviewed. No pertinent past medical history.  PAST SURGICAL HISTORY: History reviewed. No pertinent surgical  history.  MEDICATIONS:   Current Outpatient Medications:      alendronate (FOSAMAX) 35 MG tablet, Take 1 tablet every week by oral route., Disp: , Rfl:      Continuous Blood Gluc Sensor (DEXCOM G6 SENSOR) MISC, TO BE USED TO READ BLOOD SUGARS, FOLLOW  DIRECTIONS. CHANGE SENSOR EVERY 10 DAYS., Disp: , Rfl:      famotidine (PEPCID) 40 MG tablet, Take 40 mg by mouth daily, Disp: , Rfl:      ferrous sulfate (FEROSUL) 325 (65 Fe) MG tablet, Take 325 mg by mouth, Disp: , Rfl:      Ferrous Sulfate 5 MG/20ML LIQD, ferrous sulfate, Disp: , Rfl:      folic acid (FOLVITE) 1 MG tablet, Take 1 tablet every day by oral route., Disp: , Rfl:      furosemide (LASIX) 20 MG tablet, Take 1 tablet by mouth every morning, Disp: , Rfl:      Insulin Aspart FlexPen 100 UNIT/ML SOPN, Inject 6 Units Subcutaneous, Disp: , Rfl:      insulin lispro (HUMALOG VIAL) 100 UNIT/ML vial, , Disp: , Rfl:      metoprolol tartrate (LOPRESSOR) 25 MG tablet, TAKE 1 TABLET TWICE A DAY (REPLACES PREVIOUS PRESCRIPTION), Disp: , Rfl:      potassium chloride ER (K-TAB/KLOR-CON) 10 MEQ CR tablet, Take 1 tablet by mouth daily with food, Disp: , Rfl:      predniSONE (DELTASONE) 1 MG tablet, Take 3 mg by mouth, Disp: , Rfl:      Vitamin D3 (VITAMIN D-1000 MAX ST) 25 mcg (1000 units) tablet, Take 1,000 Units by mouth daily, Disp: , Rfl:      warfarin ANTICOAGULANT (COUMADIN) 3 MG tablet, Take by mouth 6 mg (3 mg x 2) every Tue, Sat; 4.5 mg (3 mg x 1.5) all other days, Disp: , Rfl:   ALLERGIES:    Allergies   Allergen Reactions     Bee Venom Hives and Rash     SOCIAL HISTORY:   Social History     Socioeconomic History     Marital status:      Spouse name: Not on file     Number of children: Not on file     Years of education: Not on file     Highest education level: Not on file   Occupational History     Not on file   Tobacco Use     Smoking status: Former     Types: Cigarettes     Smokeless tobacco: Former   Substance and Sexual Activity     Alcohol  use: Not on file     Drug use: Not on file     Sexual activity: Not on file   Other Topics Concern     Not on file   Social History Narrative     Not on file     Social Determinants of Health     Financial Resource Strain: Low Risk  (5/8/2023)    [email or web address]    Financial Resource Strain      Difficulty of Paying Living Expenses: 3      Difficulty of Paying Living Expenses: Not on file   Food Insecurity: No Food Insecurity (5/8/2023)    [facility] [employer] [email or web address], [facility]    Food Insecurity      Worried About Running Out of Food in the Last Year: 1   Transportation Needs: No Transportation Needs (5/8/2023)    [email or web address], [facility]    Transportation Needs      Lack of Transportation (Medical): 1   Physical Activity: Inactive (8/27/2020)    [facility], [facility]    Exercise Vital Sign      Days of Exercise per Week: 0 days      Minutes of Exercise per Session: 30 min   Stress: No Stress Concern Present (8/27/2020)    Received from [facility], [facility]    [employer] [facility] [employer] - Occupational Stress Questionnaire      Feeling of Stress : Only a little   Social Connections: Unknown (5/8/2024)    [email or web address]    Social Connections      Frequency of Communication with Friends and Family: Not on file   Interpersonal Safety: Not on file   Housing Stability: Low Risk  (5/8/2023)    [email or web address], [facility] [employer] [facility]    Housing Stability      Unable to Pay for Housing in the Last Year: 1     FAMILY HISTORY: History reviewed. No pertinent family history.  EXAM:Vitals: /80 (BP Location: Left arm, Patient Position: Sitting, Cuff  "Size: Adult Regular)   Temp 97.5  F (36.4  C) (Temporal)   Ht 1.765 m (5' 9.5\")   Wt 87.1 kg (192 lb)   BMI 27.95 kg/m    BMI= Body mass index is 27.95 kg/m .    General appearance: Patient is alert and fully cooperative with history & exam.  No sign of distress is noted during the visit.     Psychiatric: Affect is pleasant & appropriate.  Patient appears motivated to improve health.     Respiratory: Breathing is regular & unlabored while sitting.     HEENT: Hearing is intact to spoken word.  Speech is clear.  No gross evidence of visual impairment that would impact ambulation.       Vascular: DP 1/4 & PT 0/4 left & right.  CFT delayed with dependent rubor noted about the digits.  Diminished hair growth distal to mid tibia and no hair about the foot and toes.  Temperature changes noted, warm to cool proximal to distal.  Hemosiderin pigmentation noted with multiple varicosities legs and feet bilateral. Generalized edema bilateral legs and feet.  Pt denies claudication history.     Neurologic: Normal plantar response bilateral.  Diminished protective threshold plus 0/10 applications of a 5.07 monofilament.  Pt admits  burning and paraesthesias about the feet and toes with palpation.     Dermatologic: All 10 nails are dystrophic but in reasonable repair diminished texture turgor and tone about the integument.  Skin is thin & shiny.  Previous ulceration about the plantar right first metatarsal head is now closed with some hyperkeratosis and dry hematogenous exudate in the hyperkeratosis noted.  Hyperkeratosis also noted of bilateral fifth metatarsal heads plantarly.  No ulcerations noted with debridement.       Musculoskeletal: Patient is ambulatory without assistive device or brace.  There is semi reducible contracture of the lesser digits.  Subtle discomfort is noted about the lateral ankle sinus tarsi peroneal tendons fifth metatarsal base.  No palpable localized edema.  No erythema or heat.  Mildly reduced range " of motion through the ankle and subtalar joint.    Creatinine (mg/dL)   Date Value   12/07/2022 1.07   11/23/2022 1.32 (H)   11/20/2022 1.36 (H)     ASSESSMENT:       ICD-10-CM    1. 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 Hallux limitus of right foot  M20.5X1       3. History of diabetic ulcer of foot  Z86.31           PLAN:    8/30/2023  Recommend appropriate shoe gear to provide stability about the lateral column peroneal tendons.  Just got DM shoes within this year.  Discussed the importance of appropriate shoe gear.  May modify the shoe if they are not working properly or fitting well.  All questions were answered  Follow-up if this remains symptomatic otherwise once yearly for diabetic foot exam.    6/24/2024  I debrided the ulceration plantar right first metatarsal head to and including subcutaneous tissue with a 15 blade scalpel.  Order for DM shoes at [place] to be able to offload first and fifth met heads.  Sterile dressing applied  RTC 1-2 months to confirm the ulceration is resolved  Due to neuropathy recommend staying in diabetic shoes only.    8/21/2024  Gets DM shoes in 8/28/24  So follow up here in 6 weeks and then probably 9 months so that we can keep Dm shoes in good repair every 12 months     10/2/2024  Patient did obtain new diabetic shoes since last visit and has had them adjusted a couple of times.  They appear to be protecting him well and he is recovered with closed ulceration on the plantar first metatarsal head.  He is leaving for [place] for about 6 months, and he feels it best to follow-up again in about 2 months to confirm that these ulcerations remain resolved.  I think that does make sense as it will be quite a bit of time before he will follow-up here.  Follow-up again in about 2 months just before he goes to [place].  Follow-up sooner if necessary with any drainage or discoloration on the bottom of the feet.  All questions were answered.  Patient is very high  risk for ulceration, admission, amputation, with deformity vascular disease neuropathy diabetes.      Darrian Degroot DPM                Again, thank you for allowing me to participate in the care of your patient.        Sincerely,        Darrian Degroot DPM

## 2024-10-02 NOTE — PROGRESS NOTES
Chief Complaint   Patient presents with    WOUND CARE     Ulcer plantar Right 1st metatarsal head, onset June 2024; LOV 8/21/2024  Callus Left and Right 1st and 5th metatarsal head; LOV 6/24/2024    Diabetes     Type 2, picking up DM shoes 8/28/2024 on his 89th bday    Other     Presents with his daughter 8/21/2024  Pacemaker  Warfarin     HPI:  Corwin Fontaine is a 88 year old male who is seen in consultation at the request of self.    Pt presents for eval of:   (Onset, Location, L/R, Character, Treatments, Injury if yes)    DM type 2    Warfarin    Stroke 2020, left side     Ongoing for 1 year, lateral Left ankle pain that is more constant June 2023. Presents with his youngest daughter.  Pain in lateral malleolus left and under right 5th met head.  Hx of ulcers on both feet.     Retired.    Since last visit he has obtained new diabetic shoes and has had difficulty fitting them.  His feet were sliding around and he went back to the orthotist a couple of times to modify and adjust them and he thinks he might have them figured out but he still has some dark spots on the bottom ball of his foot.    Review of Systems:  Patient denies fever, chills, rash, wound, stiffness, limping, numbness, weakness, heart burn, blood in stool, chest pain with activity, calf pain when walking, shortness of breath with activity, chronic cough, easy bleeding/bruising, swelling of ankles, excessive thirst, fatigue, depression, anxiety.  Patient admits only to symptoms noted in history.     There is no problem list on file for this patient.    PAST MEDICAL HISTORY: History reviewed. No pertinent past medical history.  PAST SURGICAL HISTORY: History reviewed. No pertinent surgical history.  MEDICATIONS:   Current Outpatient Medications:     alendronate (FOSAMAX) 35 MG tablet, Take 1 tablet every week by oral route., Disp: , Rfl:     Continuous Blood Gluc Sensor (DEXCOM G6 SENSOR) MISC, TO BE USED TO READ BLOOD SUGARS, FOLLOW   DIRECTIONS. CHANGE SENSOR EVERY 10 DAYS., Disp: , Rfl:     famotidine (PEPCID) 40 MG tablet, Take 40 mg by mouth daily, Disp: , Rfl:     ferrous sulfate (FEROSUL) 325 (65 Fe) MG tablet, Take 325 mg by mouth, Disp: , Rfl:     Ferrous Sulfate 5 MG/20ML LIQD, ferrous sulfate, Disp: , Rfl:     folic acid (FOLVITE) 1 MG tablet, Take 1 tablet every day by oral route., Disp: , Rfl:     furosemide (LASIX) 20 MG tablet, Take 1 tablet by mouth every morning, Disp: , Rfl:     Insulin Aspart FlexPen 100 UNIT/ML SOPN, Inject 6 Units Subcutaneous, Disp: , Rfl:     insulin lispro (HUMALOG VIAL) 100 UNIT/ML vial, , Disp: , Rfl:     metoprolol tartrate (LOPRESSOR) 25 MG tablet, TAKE 1 TABLET TWICE A DAY (REPLACES PREVIOUS PRESCRIPTION), Disp: , Rfl:     potassium chloride ER (K-TAB/KLOR-CON) 10 MEQ CR tablet, Take 1 tablet by mouth daily with food, Disp: , Rfl:     predniSONE (DELTASONE) 1 MG tablet, Take 3 mg by mouth, Disp: , Rfl:     Vitamin D3 (VITAMIN D-1000 MAX ST) 25 mcg (1000 units) tablet, Take 1,000 Units by mouth daily, Disp: , Rfl:     warfarin ANTICOAGULANT (COUMADIN) 3 MG tablet, Take by mouth 6 mg (3 mg x 2) every Tue, Sat; 4.5 mg (3 mg x 1.5) all other days, Disp: , Rfl:   ALLERGIES:    Allergies   Allergen Reactions    Bee Venom Hives and Rash     SOCIAL HISTORY:   Social History     Socioeconomic History    Marital status:      Spouse name: Not on file    Number of children: Not on file    Years of education: Not on file    Highest education level: Not on file   Occupational History    Not on file   Tobacco Use    Smoking status: Former     Types: Cigarettes    Smokeless tobacco: Former   Substance and Sexual Activity    Alcohol use: Not on file    Drug use: Not on file    Sexual activity: Not on file   Other Topics Concern    Not on file   Social History Narrative    Not on file     Social Determinants of Health     Financial Resource Strain: Low Risk  (5/8/2023)    [facility]    Financial Resource Strain     Difficulty of Paying Living Expenses: 3     Difficulty of Paying Living Expenses: Not on file   Food Insecurity: No Food Insecurity (5/8/2023)    [facility], [facility]    Food Insecurity     Worried About Running Out of Food in the Last Year: 1   Transportation Needs: No Transportation Needs (5/8/2023)    Received from [facility], [facility]    Transportation Needs     Lack of Transportation (Medical): 1   Physical Activity: Inactive (8/27/2020)    [facility] from [facility], [facility]    Exercise Vital Sign     Days of Exercise per Week: 0 days     Minutes of Exercise per Session: 30 min   Stress: No Stress Concern Present (8/27/2020)    Received from [facility], [facility] [place] [facility] - Occupational Stress Questionnaire     Feeling of Stress : Only a little   Social Connections: Unknown (5/8/2024)    Received from [facility]    Social Connections     Frequency of Communication with Friends and Family: Not on file   Interpersonal Safety: Not on file   Housing Stability: Low Risk  (5/8/2023)    Received from [facility], [facility]    Housing Stability     Unable to Pay for Housing in the Last Year: 1     FAMILY HISTORY: History reviewed. No pertinent family history.  EXAM:Vitals: /80 (BP Location: Left arm, Patient Position: Sitting, Cuff Size: Adult Regular)   Temp 97.5  F (36.4  C) (Temporal)   Ht 1.765 m (5' 9.5\")   Wt 87.1 kg (192 lb)   BMI 27.95 kg/m    BMI= Body mass index is 27.95 kg/m .    General appearance: Patient is alert and fully cooperative with history & exam.  No sign of distress is noted during the visit.   "   Psychiatric: Affect is pleasant & appropriate.  Patient appears motivated to improve health.     Respiratory: Breathing is regular & unlabored while sitting.     HEENT: Hearing is intact to spoken word.  Speech is clear.  No gross evidence of visual impairment that would impact ambulation.       Vascular: DP 1/4 & PT 0/4 left & right.  CFT delayed with dependent rubor noted about the digits.  Diminished hair growth distal to mid tibia and no hair about the foot and toes.  Temperature changes noted, warm to cool proximal to distal.  Hemosiderin pigmentation noted with multiple varicosities legs and feet bilateral. Generalized edema bilateral legs and feet.  Pt denies claudication history.     Neurologic: Normal plantar response bilateral.  Diminished protective threshold plus 0/10 applications of a 5.07 monofilament.  Pt admits  burning and paraesthesias about the feet and toes with palpation.     Dermatologic: All 10 nails are dystrophic but in reasonable repair diminished texture turgor and tone about the integument.  Skin is thin & shiny.  Previous ulceration about the plantar right first metatarsal head is now closed with some hyperkeratosis and dry hematogenous exudate in the hyperkeratosis noted.  Hyperkeratosis also noted of bilateral fifth metatarsal heads plantarly.  No ulcerations noted with debridement.       Musculoskeletal: Patient is ambulatory without assistive device or brace.  There is semi reducible contracture of the lesser digits.  Subtle discomfort is noted about the lateral ankle sinus tarsi peroneal tendons fifth metatarsal base.  No palpable localized edema.  No erythema or heat.  Mildly reduced range of motion through the ankle and subtalar joint.    Creatinine (mg/dL)   Date Value   12/07/2022 1.07   11/23/2022 1.32 (H)   11/20/2022 1.36 (H)     ASSESSMENT:       ICD-10-CM    1. 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 Hallux limitus of right foot  M20.5X1       3.  History of diabetic ulcer of foot  Z86.31           PLAN:    8/30/2023  Recommend appropriate shoe gear to provide stability about the lateral column peroneal tendons.  Just got DM shoes within this year.  Discussed the importance of appropriate shoe gear.  May modify the shoe if they are not working properly or fitting well.  All questions were answered  Follow-up if this remains symptomatic otherwise once yearly for diabetic foot exam.    6/24/2024  I debrided the ulceration plantar right first metatarsal head to and including subcutaneous tissue with a 15 blade scalpel.  Order for DM shoes at [place] to be able to offload first and fifth met heads.  Sterile dressing applied  RTC 1-2 months to confirm the ulceration is resolved  Due to neuropathy recommend staying in diabetic shoes only.    8/21/2024  Gets DM shoes in 8/28/24  So follow up here in 6 weeks and then probably 9 months so that we can keep Dm shoes in good repair every 12 months     10/2/2024  Patient did obtain new diabetic shoes since last visit and has had them adjusted a couple of times.  They appear to be protecting him well and he is recovered with closed ulceration on the plantar first metatarsal head.  He is leaving for [place] for about 6 months, and he feels it best to follow-up again in about 2 months to confirm that these ulcerations remain resolved.  I think that does make sense as it will be quite a bit of time before he will follow-up here.  Follow-up again in about 2 months just before he goes to [place].  Follow-up sooner if necessary with any drainage or discoloration on the bottom of the feet.  All questions were answered.  Patient is very high risk for ulceration, admission, amputation, with deformity vascular disease neuropathy diabetes.      Darrian Degroot DPM

## 2024-12-11 ENCOUNTER — OFFICE VISIT (OUTPATIENT)
Dept: PODIATRY | Facility: CLINIC | Age: 89
End: 2024-12-11
Payer: COMMERCIAL

## 2024-12-11 VITALS
WEIGHT: 194 LBS | SYSTOLIC BLOOD PRESSURE: 112 MMHG | HEIGHT: 70 IN | BODY MASS INDEX: 27.77 KG/M2 | DIASTOLIC BLOOD PRESSURE: 52 MMHG

## 2024-12-11 DIAGNOSIS — Z86.31 HISTORY OF DIABETIC ULCER OF FOOT: ICD-10-CM

## 2024-12-11 DIAGNOSIS — E11.40 CONTROLLED TYPE 2 DIABETES WITH NEUROPATHY (H): Primary | ICD-10-CM

## 2024-12-11 PROCEDURE — 99213 OFFICE O/P EST LOW 20 MIN: CPT | Performed by: PODIATRIST

## 2024-12-11 ASSESSMENT — PAIN SCALES - GENERAL: PAINLEVEL_OUTOF10: SEVERE PAIN (6)

## 2024-12-11 NOTE — PROGRESS NOTES
Chief Complaint   Patient presents with    WOUND CARE     No drainage, ulcer plantar Right 1st metatarsal head, onset June 2024; LOV 10/2/2024  Callus Left and Right 1st and 5th metatarsal head; LOV 10/2/2024    Diabetes     Type 2, obtained DM shoes 8/28/2024    Other     Presents with his daughter 10/2/2024  Pacemaker  Warfarin     HPI:  Corwin Fontaine is a 88 year old male who is seen in consultation at the request of self.    Pt presents for eval of:   (Onset, Location, L/R, Character, Treatments, Injury if yes)    DM type 2    Warfarin    Stroke 2020, left side     Ongoing for 1 year, lateral Left ankle pain that is more constant June 2023. Presents with his youngest daughter.  Pain in lateral malleolus left and under right 5th met head.  Hx of ulcers on both feet.     Retired.    Since last visit he has obtained new diabetic shoes and has had difficulty fitting them.  His feet were sliding around and he went back to the orthotist a couple of times to modify and adjust them and he thinks he might have them figured out but he still has some dark spots on the bottom ball of his foot.    Review of Systems:  Patient denies fever, chills, rash, wound, stiffness, limping, numbness, weakness, heart burn, blood in stool, chest pain with activity, calf pain when walking, shortness of breath with activity, chronic cough, easy bleeding/bruising, swelling of ankles, excessive thirst, fatigue, depression, anxiety.  Patient admits only to symptoms noted in history.     There is no problem list on file for this patient.    PAST MEDICAL HISTORY: History reviewed. No pertinent past medical history.  PAST SURGICAL HISTORY: History reviewed. No pertinent surgical history.  MEDICATIONS:   Current Outpatient Medications:     alendronate (FOSAMAX) 35 MG tablet, Take 1 tablet every week by oral route., Disp: , Rfl:     Continuous Blood Gluc Sensor (DEXCOM G6 SENSOR) MISC, TO BE USED TO READ BLOOD SUGARS, FOLLOW   DIRECTIONS. CHANGE SENSOR EVERY 10 DAYS., Disp: , Rfl:     famotidine (PEPCID) 40 MG tablet, Take 40 mg by mouth daily, Disp: , Rfl:     ferrous sulfate (FEROSUL) 325 (65 Fe) MG tablet, Take 325 mg by mouth, Disp: , Rfl:     Ferrous Sulfate 5 MG/20ML LIQD, ferrous sulfate, Disp: , Rfl:     folic acid (FOLVITE) 1 MG tablet, Take 1 tablet every day by oral route., Disp: , Rfl:     furosemide (LASIX) 20 MG tablet, Take 1 tablet by mouth every morning, Disp: , Rfl:     Insulin Aspart FlexPen 100 UNIT/ML SOPN, Inject 6 Units Subcutaneous, Disp: , Rfl:     insulin lispro (HUMALOG VIAL) 100 UNIT/ML vial, , Disp: , Rfl:     metoprolol tartrate (LOPRESSOR) 25 MG tablet, TAKE 1 TABLET TWICE A DAY (REPLACES PREVIOUS PRESCRIPTION), Disp: , Rfl:     potassium chloride ER (K-TAB/KLOR-CON) 10 MEQ CR tablet, Take 1 tablet by mouth daily with food, Disp: , Rfl:     predniSONE (DELTASONE) 1 MG tablet, Take 3 mg by mouth, Disp: , Rfl:     Vitamin D3 (VITAMIN D-1000 MAX ST) 25 mcg (1000 units) tablet, Take 1,000 Units by mouth daily, Disp: , Rfl:     warfarin ANTICOAGULANT (COUMADIN) 3 MG tablet, Take by mouth 6 mg (3 mg x 2) every Tue, Sat; 4.5 mg (3 mg x 1.5) all other days, Disp: , Rfl:   ALLERGIES:    Allergies   Allergen Reactions    Bee Venom Hives and Rash     SOCIAL HISTORY:   Social History     Socioeconomic History    Marital status:      Spouse name: Not on file    Number of children: Not on file    Years of education: Not on file    Highest education level: Not on file   Occupational History    Not on file   Tobacco Use    Smoking status: Former     Types: Cigarettes    Smokeless tobacco: Former   Substance and Sexual Activity    Alcohol use: Not on file    Drug use: Not on file    Sexual activity: Not on file   Other Topics Concern    Not on file   Social History Narrative    Not on file     Social Drivers of Health     Financial Resource Strain: Low Risk  (5/8/2023)    Received from [facility] [employer] [facility]  [facility], [facility]    Financial Resource Strain     Difficulty of Paying Living Expenses: 3     Difficulty of Paying Living Expenses: Not on file   Food Insecurity: No Food Insecurity (5/8/2023)    Received from [facility], [facility]    Food Insecurity     Worried About Running Out of Food in the Last Year: 1   Transportation Needs: No Transportation Needs (5/8/2023)    Received from [facility], [facility]    Transportation Needs     Lack of Transportation (Medical): 1   Physical Activity: Inactive (8/27/2020)    Received from [facility], [facility]    Exercise Vital Sign     Days of Exercise per Week: 0 days     Minutes of Exercise per Session: 30 min   Stress: No Stress Concern Present (8/27/2020)    Received from [facility], [facility]    [employer] [place] [employer] - Occupational Stress Questionnaire     Feeling of Stress : Only a little   Social Connections: Unknown (5/8/2024)    Received from [facility]    Social Connections     Frequency of Communication with Friends and Family: Not on file   Interpersonal Safety: Not on file   Housing Stability: Low Risk  (5/8/2023)    Received from [facility], [facility]    Housing Stability     Unable to Pay for Housing in the Last Year: 1     FAMILY HISTORY: History reviewed. No pertinent family history.  EXAM:Vitals: /52 (BP Location: Left arm, Patient Position: Sitting, Cuff Size: Adult Regular)   Ht 1.765 m (5' 9.5\")   Wt 88 kg (194 lb)   BMI 28.24 kg/m    BMI= Body mass index is 28.24 kg/m .    General appearance: Patient is alert and fully cooperative with history & exam.  No sign of distress is noted during the visit.     Psychiatric: Affect is pleasant & " appropriate.  Patient appears motivated to improve health.     Respiratory: Breathing is regular & unlabored while sitting.     HEENT: Hearing is intact to spoken word.  Speech is clear.  No gross evidence of visual impairment that would impact ambulation.       Vascular: DP 1/4 & PT 0/4 left & right.  CFT delayed with dependent rubor noted about the digits.  Diminished hair growth distal to mid tibia and no hair about the foot and toes.  Temperature changes noted, warm to cool proximal to distal.  Hemosiderin pigmentation noted with multiple varicosities legs and feet bilateral. Generalized edema bilateral legs and feet.  Pt denies claudication history.     Neurologic: Normal plantar response bilateral.  Diminished protective threshold plus 0/10 applications of a 5.07 monofilament.  Pt admits  burning and paraesthesias about the feet and toes with palpation.     Dermatologic: All 10 nails are dystrophic but in reasonable repair diminished texture turgor and tone about the integument.  Skin is thin & shiny.  Previous ulceration about the plantar right first metatarsal head is now closed with a hyperkeratosis and dry hematogenous exudate in the hyperkeratosis noted.  Hyperkeratosis also noted of bilateral fifth metatarsal heads plantarly.  No ulcerations noted with debridement.       Musculoskeletal: Patient is ambulatory without assistive device or brace.  There is semi reducible contracture of the lesser digits.  Subtle discomfort is noted about the lateral ankle sinus tarsi peroneal tendons fifth metatarsal base.  No palpable localized edema.  No erythema or heat.  Mildly reduced range of motion through the ankle and subtalar joint.    Creatinine (mg/dL)   Date Value   12/07/2022 1.07   11/23/2022 1.32 (H)   11/20/2022 1.36 (H)     ASSESSMENT:       ICD-10-CM    1. 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 History of diabetic ulcer of foot  Z86.31           PLAN:    8/30/2023  Recommend  appropriate shoe gear to provide stability about the lateral column peroneal tendons.  Just got DM shoes within this year.  Discussed the importance of appropriate shoe gear.  May modify the shoe if they are not working properly or fitting well.  All questions were answered  Follow-up if this remains symptomatic otherwise once yearly for diabetic foot exam.    6/24/2024  I debrided the ulceration plantar right first metatarsal head to and including subcutaneous tissue with a 15 blade scalpel.  Order for DM shoes at [facility] to be able to offload first and fifth met heads.  Sterile dressing applied  RTC 1-2 months to confirm the ulceration is resolved  Due to neuropathy recommend staying in diabetic shoes only.    8/21/2024  Gets DM shoes in 8/28/24  So follow up here in 6 weeks and then probably 9 months so that we can keep Dm shoes in good repair every 12 months     10/2/2024  Patient did obtain new diabetic shoes since last visit and has had them adjusted a couple of times.  They appear to be protecting him well and he is recovered with closed ulceration on the plantar first metatarsal head.  He is leaving for [place] for about 6 months, and he feels it best to follow-up again in about 2 months to confirm that these ulcerations remain resolved.  I think that does make sense as it will be quite a bit of time before he will follow-up here.  Follow-up again in about 2 months just before he goes to [place].  Follow-up sooner if necessary with any drainage or discoloration on the bottom of the feet.  All questions were answered.  Patient is very high risk for ulceration, admission, amputation, with deformity vascular disease neuropathy diabetes.    12/11/2024    At this time no ulcerations are noted and the shoe appears to be conforming quite well.  If he still has complaints we could try different shoe or wait until the next cycle if he can manage it until he is eligible again.      Darrian Degroot DPM

## 2024-12-11 NOTE — LETTER
12/11/2024      Corwin Fontaine  [address] [address] [place] [address]      Dear Colleague,    Thank you for referring your patient, Corwin Fontaine, to the [facility]. Please see a copy of my visit note below.    Chief Complaint   Patient presents with     WOUND CARE     No drainage, ulcer plantar Right 1st metatarsal head, onset June 2024; LOV 10/2/2024  Callus Left and Right 1st and 5th metatarsal head; LOV 10/2/2024     Diabetes     Type 2, obtained DM shoes 8/28/2024     Other     Presents with his daughter 10/2/2024  Pacemaker  Warfarin     HPI:  Corwin Fontaine is a 88 year old male who is seen in consultation at the request of self.    Pt presents for eval of:   (Onset, Location, L/R, Character, Treatments, Injury if yes)    DM type 2    Warfarin    Stroke 2020, left side     Ongoing for 1 year, lateral Left ankle pain that is more constant June 2023. Presents with his youngest daughter.  Pain in lateral malleolus left and under right 5th met head.  Hx of ulcers on both feet.     Retired.    Since last visit he has obtained new diabetic shoes and has had difficulty fitting them.  His feet were sliding around and he went back to the orthotist a couple of times to modify and adjust them and he thinks he might have them figured out but he still has some dark spots on the bottom ball of his foot.    Review of Systems:  Patient denies fever, chills, rash, wound, stiffness, limping, numbness, weakness, heart burn, blood in stool, chest pain with activity, calf pain when walking, shortness of breath with activity, chronic cough, easy bleeding/bruising, swelling of ankles, excessive thirst, fatigue, depression, anxiety.  Patient admits only to symptoms noted in history.     There is no problem list on file for this patient.    PAST MEDICAL HISTORY: History reviewed. No pertinent past medical history.  PAST SURGICAL HISTORY: History reviewed. No pertinent surgical history.  MEDICATIONS:    Current Outpatient Medications:      alendronate (FOSAMAX) 35 MG tablet, Take 1 tablet every week by oral route., Disp: , Rfl:      Continuous Blood Gluc Sensor (DEXCOM G6 SENSOR) MISC, TO BE USED TO READ BLOOD SUGARS, FOLLOW  DIRECTIONS. CHANGE SENSOR EVERY 10 DAYS., Disp: , Rfl:      famotidine (PEPCID) 40 MG tablet, Take 40 mg by mouth daily, Disp: , Rfl:      ferrous sulfate (FEROSUL) 325 (65 Fe) MG tablet, Take 325 mg by mouth, Disp: , Rfl:      Ferrous Sulfate 5 MG/20ML LIQD, ferrous sulfate, Disp: , Rfl:      folic acid (FOLVITE) 1 MG tablet, Take 1 tablet every day by oral route., Disp: , Rfl:      furosemide (LASIX) 20 MG tablet, Take 1 tablet by mouth every morning, Disp: , Rfl:      Insulin Aspart FlexPen 100 UNIT/ML SOPN, Inject 6 Units Subcutaneous, Disp: , Rfl:      insulin lispro (HUMALOG VIAL) 100 UNIT/ML vial, , Disp: , Rfl:      metoprolol tartrate (LOPRESSOR) 25 MG tablet, TAKE 1 TABLET TWICE A DAY (REPLACES PREVIOUS PRESCRIPTION), Disp: , Rfl:      potassium chloride ER (K-TAB/KLOR-CON) 10 MEQ CR tablet, Take 1 tablet by mouth daily with food, Disp: , Rfl:      predniSONE (DELTASONE) 1 MG tablet, Take 3 mg by mouth, Disp: , Rfl:      Vitamin D3 (VITAMIN D-1000 MAX ST) 25 mcg (1000 units) tablet, Take 1,000 Units by mouth daily, Disp: , Rfl:      warfarin ANTICOAGULANT (COUMADIN) 3 MG tablet, Take by mouth 6 mg (3 mg x 2) every Tue, Sat; 4.5 mg (3 mg x 1.5) all other days, Disp: , Rfl:   ALLERGIES:    Allergies   Allergen Reactions     Bee Venom Hives and Rash     SOCIAL HISTORY:   Social History     Socioeconomic History     Marital status:      Spouse name: Not on file     Number of children: Not on file     Years of education: Not on file     Highest education level: Not on file   Occupational History     Not on file   Tobacco Use     Smoking status: Former     Types: Cigarettes     Smokeless tobacco: Former   Substance and Sexual Activity     Alcohol use: Not on file      Drug use: Not on file     Sexual activity: Not on file   Other Topics Concern     Not on file   Social History Narrative     Not on file     Social Drivers of Health     Financial Resource Strain: Low Risk  (5/8/2023)    [facility] [employer] [facility] "[employer] [facility]    Financial Resource Strain      Difficulty of Paying Living Expenses: 3      Difficulty of Paying Living Expenses: Not on file   Food Insecurity: No Food Insecurity (5/8/2023)    [facility] [employer] [facility], [facility]    Food Insecurity      Worried About Running Out of Food in the Last Year: 1   Transportation Needs: No Transportation Needs (5/8/2023)    [facility] from [facility], [facility]    Transportation Needs      Lack of Transportation (Medical): 1   Physical Activity: Inactive (8/27/2020)    Received from [facility], [facility]    Exercise Vital Sign      Days of Exercise per Week: 0 days      Minutes of Exercise per Session: 30 min   Stress: No Stress Concern Present (8/27/2020)    Received from [facility], [facility]    [employer] [place] [employer] - Occupational Stress Questionnaire      Feeling of Stress : Only a little   Social Connections: Unknown (5/8/2024)    Received from [facility] [employer] [facility]    Social Connections      Frequency of Communication with Friends and Family: Not on file   Interpersonal Safety: Not on file   Housing Stability: Low Risk  (5/8/2023)    Received from [facility], [facility]    Housing Stability      Unable to Pay for Housing in the Last Year: 1     FAMILY HISTORY: History reviewed. No pertinent family history.  EXAM:Vitals: /52 (BP Location: Left arm, Patient Position: Sitting, Cuff Size: Adult Regular)   Ht  "1.765 m (5' 9.5\")   Wt 88 kg (194 lb)   BMI 28.24 kg/m    BMI= Body mass index is 28.24 kg/m .    General appearance: Patient is alert and fully cooperative with history & exam.  No sign of distress is noted during the visit.     Psychiatric: Affect is pleasant & appropriate.  Patient appears motivated to improve health.     Respiratory: Breathing is regular & unlabored while sitting.     HEENT: Hearing is intact to spoken word.  Speech is clear.  No gross evidence of visual impairment that would impact ambulation.       Vascular: DP 1/4 & PT 0/4 left & right.  CFT delayed with dependent rubor noted about the digits.  Diminished hair growth distal to mid tibia and no hair about the foot and toes.  Temperature changes noted, warm to cool proximal to distal.  Hemosiderin pigmentation noted with multiple varicosities legs and feet bilateral. Generalized edema bilateral legs and feet.  Pt denies claudication history.     Neurologic: Normal plantar response bilateral.  Diminished protective threshold plus 0/10 applications of a 5.07 monofilament.  Pt admits  burning and paraesthesias about the feet and toes with palpation.     Dermatologic: All 10 nails are dystrophic but in reasonable repair diminished texture turgor and tone about the integument.  Skin is thin & shiny.  Previous ulceration about the plantar right first metatarsal head is now closed with a hyperkeratosis and dry hematogenous exudate in the hyperkeratosis noted.  Hyperkeratosis also noted of bilateral fifth metatarsal heads plantarly.  No ulcerations noted with debridement.       Musculoskeletal: Patient is ambulatory without assistive device or brace.  There is semi reducible contracture of the lesser digits.  Subtle discomfort is noted about the lateral ankle sinus tarsi peroneal tendons fifth metatarsal base.  No palpable localized edema.  No erythema or heat.  Mildly reduced range of motion through the ankle and subtalar joint.    Creatinine " (mg/dL)   Date Value   12/07/2022 1.07   11/23/2022 1.32 (H)   11/20/2022 1.36 (H)     ASSESSMENT:       ICD-10-CM    1. 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 History of diabetic ulcer of foot  Z86.31           PLAN:    8/30/2023  Recommend appropriate shoe gear to provide stability about the lateral column peroneal tendons.  Just got DM shoes within this year.  Discussed the importance of appropriate shoe gear.  May modify the shoe if they are not working properly or fitting well.  All questions were answered  Follow-up if this remains symptomatic otherwise once yearly for diabetic foot exam.    6/24/2024  I debrided the ulceration plantar right first metatarsal head to and including subcutaneous tissue with a 15 blade scalpel.  Order for DM shoes at [place] to be able to offload first and fifth met heads.  Sterile dressing applied  RTC 1-2 months to confirm the ulceration is resolved  Due to neuropathy recommend staying in diabetic shoes only.    8/21/2024  Gets DM shoes in 8/28/24  So follow up here in 6 weeks and then probably 9 months so that we can keep Dm shoes in good repair every 12 months     10/2/2024  Patient did obtain new diabetic shoes since last visit and has had them adjusted a couple of times.  They appear to be protecting him well and he is recovered with closed ulceration on the plantar first metatarsal head.  He is leaving for [place] for about 6 months, and he feels it best to follow-up again in about 2 months to confirm that these ulcerations remain resolved.  I think that does make sense as it will be quite a bit of time before he will follow-up here.  Follow-up again in about 2 months just before he goes to [place].  Follow-up sooner if necessary with any drainage or discoloration on the bottom of the feet.  All questions were answered.  Patient is very high risk for ulceration, admission, amputation, with deformity vascular disease neuropathy  diabetes.    12/11/2024    At this time no ulcerations are noted and the shoe appears to be conforming quite well.  If he still has complaints we could try different shoe or wait until the next cycle if he can manage it until he is eligible again.      Darrian Degroot DPM                Again, thank you for allowing me to participate in the care of your patient.        Sincerely,        Darrian Degroot DPM

## 2025-05-28 ENCOUNTER — OFFICE VISIT (OUTPATIENT)
Dept: PODIATRY | Facility: CLINIC | Age: 89
End: 2025-05-28
Payer: COMMERCIAL

## 2025-05-28 VITALS — HEIGHT: 70 IN | WEIGHT: 195 LBS | BODY MASS INDEX: 27.92 KG/M2

## 2025-05-28 DIAGNOSIS — M20.5X1 HALLUX LIMITUS OF RIGHT FOOT: ICD-10-CM

## 2025-05-28 DIAGNOSIS — E11.621 TYPE 2 DIABETES MELLITUS WITH FOOT ULCER, WITH LONG-TERM CURRENT USE OF INSULIN (H): ICD-10-CM

## 2025-05-28 DIAGNOSIS — E11.40 CONTROLLED TYPE 2 DIABETES WITH NEUROPATHY (H): Primary | ICD-10-CM

## 2025-05-28 DIAGNOSIS — L97.509 TYPE 2 DIABETES MELLITUS WITH FOOT ULCER, WITH LONG-TERM CURRENT USE OF INSULIN (H): ICD-10-CM

## 2025-05-28 DIAGNOSIS — Z86.31 HISTORY OF DIABETIC ULCER OF FOOT: ICD-10-CM

## 2025-05-28 DIAGNOSIS — Z79.4 TYPE 2 DIABETES MELLITUS WITH FOOT ULCER, WITH LONG-TERM CURRENT USE OF INSULIN (H): ICD-10-CM

## 2025-05-28 RX ORDER — GABAPENTIN 100 MG/1
200 CAPSULE ORAL
COMMUNITY
Start: 2024-08-08

## 2025-05-28 RX ORDER — SPIRONOLACTONE 25 MG/1
25 TABLET ORAL
COMMUNITY
Start: 2025-05-19

## 2025-05-28 ASSESSMENT — PAIN SCALES - GENERAL: PAINLEVEL_OUTOF10: SEVERE PAIN (7)

## 2025-05-28 NOTE — LETTER
5/28/2025      Corwin Fontaine  [address] [facility] [address]      Dear Colleague,    Thank you for referring your patient, Corwin Fontaine, to the [facility]. Please see a copy of my visit note below.    Chief Complaint   Patient presents with     RECHECK     No drainage, pre ulcer plantar Right 1st metatarsal head, onset June 2024; LOV 12/11/2024  Callus Left and Right 1st and 5th metatarsal head; LOV 12/11/2024       Diabetes     Type 2, obtained DM shoes 8/28/2024     Other     Presents with his daughter today  Pacemaker  Warfarin     Consult     B/L ankle swelling, onset early May 2025; new issue     HPI:  DM type 2  Warfarin  Stroke 2020, left side  Presents with his youngest daughter.  Pain in lateral malleolus left and under right 5th met head.  Hx of ulcers on both feet.     Retired.    Late summer early fall 2024 he has obtained new diabetic shoes and has had difficulty fitting them.  Caused irritation about the lateral foot or seemed heavy and has continued ulcerations in the plantar right 1st and 5th metatarsal heads plantarly.  He is walking only short distances and short transfers.    Review of Systems:  Patient denies fever, chills, rash, wound, stiffness, limping, numbness, weakness, heart burn, blood in stool, chest pain with activity, calf pain when walking, shortness of breath with activity, chronic cough, easy bleeding/bruising, swelling of ankles, excessive thirst, fatigue, depression, anxiety.  Patient admits only to symptoms noted in history.     There is no problem list on file for this patient.    PAST MEDICAL HISTORY: History reviewed. No pertinent past medical history.  PAST SURGICAL HISTORY: History reviewed. No pertinent surgical history.  MEDICATIONS:   Current Outpatient Medications:      alendronate (FOSAMAX) 35 MG tablet, Take 1 tablet every week by oral route., Disp: , Rfl:      Continuous Blood Gluc Sensor (DEXCOM G6 SENSOR) MISC, TO BE USED TO READ  BLOOD SUGARS, FOLLOW  DIRECTIONS. CHANGE SENSOR EVERY 10 DAYS., Disp: , Rfl:      famotidine (PEPCID) 40 MG tablet, Take 40 mg by mouth daily, Disp: , Rfl:      folic acid (FOLVITE) 1 MG tablet, Take 1 tablet every day by oral route., Disp: , Rfl:      furosemide (LASIX) 20 MG tablet, Take 1 tablet by mouth every morning, Disp: , Rfl:      gabapentin (NEURONTIN) 100 MG capsule, Take 200 mg by mouth., Disp: , Rfl:      Insulin Aspart FlexPen 100 UNIT/ML SOPN, Inject 6 Units Subcutaneous, Disp: , Rfl:      insulin lispro (HUMALOG VIAL) 100 UNIT/ML vial, , Disp: , Rfl:      metoprolol tartrate (LOPRESSOR) 25 MG tablet, TAKE 1 TABLET TWICE A DAY (REPLACES PREVIOUS PRESCRIPTION), Disp: , Rfl:      potassium chloride ER (K-TAB/KLOR-CON) 10 MEQ CR tablet, Take 1 tablet by mouth daily with food, Disp: , Rfl:      predniSONE (DELTASONE) 1 MG tablet, Take 3 mg by mouth, Disp: , Rfl:      spironolactone (ALDACTONE) 25 MG tablet, Take 25 mg by mouth., Disp: , Rfl:      Vitamin D3 (VITAMIN D-1000 MAX ST) 25 mcg (1000 units) tablet, Take 1,000 Units by mouth daily, Disp: , Rfl:      warfarin ANTICOAGULANT (COUMADIN) 3 MG tablet, Take by mouth 6 mg (3 mg x 2) every Tue, Sat; 4.5 mg (3 mg x 1.5) all other days, Disp: , Rfl:      ferrous sulfate (FEROSUL) 325 (65 Fe) MG tablet, Take 325 mg by mouth (Patient not taking: Reported on 5/28/2025), Disp: , Rfl:      Ferrous Sulfate 5 MG/20ML LIQD, ferrous sulfate (Patient not taking: Reported on 5/28/2025), Disp: , Rfl:   ALLERGIES:    Allergies   Allergen Reactions     Bee Venom Hives and Rash     SOCIAL HISTORY:   Social History     Socioeconomic History     Marital status:      Spouse name: Not on file     Number of children: Not on file     Years of education: Not on file     Highest education level: Not on file   Occupational History     Not on file   Tobacco Use     Smoking status: Former     Types: Cigarettes     Smokeless tobacco: Former   Substance and Sexual  "Activity     Alcohol use: Not on file     Drug use: Not on file     Sexual activity: Not on file   Other Topics Concern     Not on file   Social History Narrative     Not on file     Social Drivers of Health     Financial Resource Strain: Low Risk  (5/8/2023)    [facility]    Financial Resource Strain      Difficulty of Paying Living Expenses: 3      Difficulty of Paying Living Expenses: Not on file   Food Insecurity: No Food Insecurity (5/8/2023)    [facility]    Food Insecurity      Worried About Running Out of Food in the Last Year: 1   Transportation Needs: No Transportation Needs (5/8/2023)    [facility]    Transportation Needs      Lack of Transportation (Medical): 1   Physical Activity: Inactive (8/27/2020)    Received from [facility]    Exercise Vital Sign      Days of Exercise per Week: 0 days      Minutes of Exercise per Session: 30 min   Stress: No Stress Concern Present (8/27/2020)    Received from [facility]    [employer] [place] [employer] - Occupational Stress Questionnaire      Feeling of Stress : Only a little   Social Connections: Unknown (5/8/2024)    [facility]    Social Connections      Frequency of Communication with Friends and Family: Not on file   Interpersonal Safety: Not on file   Housing Stability: Low Risk  (5/8/2023)    [facility]    Housing Stability      Unable to Pay for Housing in the Last Year: 1     FAMILY HISTORY: History reviewed. No pertinent family history.  EXAM:Vitals: Ht 1.765 m (5' 9.5\")   Wt 88.5 kg (195 lb)   BMI 28.38 kg/m    BMI= Body mass index is 28.38 kg/m .    General appearance: Patient is alert and fully cooperative with history & exam.  No sign of distress is noted during the visit.     Psychiatric: Affect is " pleasant & appropriate.  Patient appears motivated to improve health.     Respiratory: Breathing is regular & unlabored while sitting.     HEENT: Hearing is intact to spoken word.  Speech is clear.  No gross evidence of visual impairment that would impact ambulation.       Vascular: DP 1/4 & PT 0/4 left & right.  CFT delayed with dependent rubor noted about the digits.  Diminished hair growth distal to mid tibia and no hair about the foot and toes.  Temperature changes noted, warm to cool proximal to distal.  Hemosiderin pigmentation noted with multiple varicosities legs and feet bilateral. Generalized edema bilateral legs and feet.  Pt denies claudication history.     Neurologic: Normal plantar response bilateral.  Diminished protective threshold plus 0/10 applications of a 5.07 monofilament.  Pt admits  burning and paraesthesias about the feet and toes with palpation.     Dermatologic: All 10 nails are dystrophic but in reasonable repair diminished texture turgor and tone about the integument.  Skin is thin & shiny.  Previous ulceration about the plantar right first metatarsal head is now closed with a hyperkeratosis and dry hematogenous exudate in the hyperkeratosis noted.  Hyperkeratosis also noted of bilateral fifth metatarsal heads plantarly.  No ulcerations noted with debridement.       Musculoskeletal: Patient is ambulatory without assistive device or brace.  There is semi reducible contracture of the lesser digits.  Subtle discomfort is noted about the lateral ankle sinus tarsi peroneal tendons fifth metatarsal base.  No palpable localized edema.  No erythema or heat.  Mildly reduced range of motion through the ankle and subtalar joint.    Creatinine (mg/dL)   Date Value   12/07/2022 1.07   11/23/2022 1.32 (H)   11/20/2022 1.36 (H)     ASSESSMENT:       ICD-10-CM    1. 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 ...      2. History of diabetic ulcer of foot  Z86.31 Orthotics, Mastectomy and Custom Compression Orders Type: Orthotic; Orthotic Type Requested: Diabetic Shoe(s)/Insert(s); X-Depth Shoe(s): Yes; Insert(s) Quantity: 3 pair; Req Documentation: Progress note must support the need for shoes/orthotics. ...      3. Hallux limitus of right foot  M20.5X1 Orthotics, Mastectomy and Custom Compression Orders Type: Orthotic; Orthotic Type Requested: Diabetic Shoe(s)/Insert(s); X-Depth Shoe(s): Yes; Insert(s) Quantity: 3 pair; Req Documentation: Progress note must support the need for shoes/orthotics. ...      4. Type 2 diabetes mellitus with foot ulcer, with long-term current use of insulin (H)  E11.621 Orthotics, Mastectomy and Custom Compression Orders Type: Orthotic; Orthotic Type Requested: Diabetic Shoe(s)/Insert(s); X-Depth Shoe(s): Yes; Insert(s) Quantity: 3 pair; Req Documentation: Progress note must support the need for shoes/orthotics. ...    L97.509     Z79.4           PLAN:    8/30/2023  Recommend appropriate shoe gear to provide stability about the lateral column peroneal tendons.  Just got DM shoes within this year.  Discussed the importance of appropriate shoe gear.  May modify the shoe if they are not working properly or fitting well.  All questions were answered  Follow-up if this remains symptomatic otherwise once yearly for diabetic foot exam.    6/24/2024  I debrided the ulceration plantar right first metatarsal head to and including subcutaneous tissue with a 15 blade scalpel.  Order for DM shoes at [place] to be able to offload first and fifth met heads.  Sterile dressing applied  RTC 1-2 months to confirm the ulceration is resolved  Due to neuropathy recommend staying in diabetic shoes only.    8/21/2024  Gets DM shoes in 8/28/24  So follow  up here in 6 weeks and then probably 9 months so that we can keep Dm shoes in good repair every 12 months     10/2/2024  Patient did obtain new diabetic shoes since last visit and has had them adjusted a couple of times.  They appear to be protecting him well and he is recovered with closed ulceration on the plantar first metatarsal head.  He is leaving for [place] for about 6 months, and he feels it best to follow-up again in about 2 months to confirm that these ulcerations remain resolved.  I think that does make sense as it will be quite a bit of time before he will follow-up here.  Follow-up again in about 2 months just before he goes to [place].  Follow-up sooner if necessary with any drainage or discoloration on the bottom of the feet.  All questions were answered.  Patient is very high risk for ulceration, admission, amputation, with deformity vascular disease neuropathy diabetes.    12/11/2024    At this time no ulcerations are noted and the shoe appears to be conforming quite well.  If he still has complaints we could try different shoe or wait until the next cycle if he can manage it until he is eligible again.      5/28/2025  RTC 6 months  Order for diabetic shoes today.    He does not like his current shoe as it is too heavy and applies pressure on the lateral left calcaneus.  Minimal debridement of the plantar first metatarsal head ulcers plantar first fifth metatarsal head as there is subtle hyperkeratosis with subtle hematogenous exudate but they are dry.      Darrian Degroot DPM                Again, thank you for allowing me to participate in the care of your patient.        Sincerely,        Darrian Degroot DPM    Electronically signed

## 2025-05-28 NOTE — PROGRESS NOTES
Chief Complaint   Patient presents with    RECHECK     No drainage, pre ulcer plantar Right 1st metatarsal head, onset June 2024; LOV 12/11/2024  Callus Left and Right 1st and 5th metatarsal head; LOV 12/11/2024      Diabetes     Type 2, obtained DM shoes 8/28/2024    Other     Presents with his daughter today  Pacemaker  Warfarin    Consult     B/L ankle swelling, onset early May 2025; new issue     HPI:  DM type 2  Warfarin  Stroke 2020, left side  Presents with his youngest daughter.  Pain in lateral malleolus left and under right 5th met head.  Hx of ulcers on both feet.     Retired.    Late summer early fall 2024 he has obtained new diabetic shoes and has had difficulty fitting them.  Caused irritation about the lateral foot or seemed heavy and has continued ulcerations in the plantar right 1st and 5th metatarsal heads plantarly.  He is walking only short distances and short transfers.    Review of Systems:  Patient denies fever, chills, rash, wound, stiffness, limping, numbness, weakness, heart burn, blood in stool, chest pain with activity, calf pain when walking, shortness of breath with activity, chronic cough, easy bleeding/bruising, swelling of ankles, excessive thirst, fatigue, depression, anxiety.  Patient admits only to symptoms noted in history.     There is no problem list on file for this patient.    PAST MEDICAL HISTORY: History reviewed. No pertinent past medical history.  PAST SURGICAL HISTORY: History reviewed. No pertinent surgical history.  MEDICATIONS:   Current Outpatient Medications:     alendronate (FOSAMAX) 35 MG tablet, Take 1 tablet every week by oral route., Disp: , Rfl:     Continuous Blood Gluc Sensor (DEXCOM G6 SENSOR) MISC, TO BE USED TO READ BLOOD SUGARS, FOLLOW  DIRECTIONS. CHANGE SENSOR EVERY 10 DAYS., Disp: , Rfl:     famotidine (PEPCID) 40 MG tablet, Take 40 mg by mouth daily, Disp: , Rfl:     folic acid (FOLVITE) 1 MG tablet, Take 1 tablet every day by oral  route., Disp: , Rfl:     furosemide (LASIX) 20 MG tablet, Take 1 tablet by mouth every morning, Disp: , Rfl:     gabapentin (NEURONTIN) 100 MG capsule, Take 200 mg by mouth., Disp: , Rfl:     Insulin Aspart FlexPen 100 UNIT/ML SOPN, Inject 6 Units Subcutaneous, Disp: , Rfl:     insulin lispro (HUMALOG VIAL) 100 UNIT/ML vial, , Disp: , Rfl:     metoprolol tartrate (LOPRESSOR) 25 MG tablet, TAKE 1 TABLET TWICE A DAY (REPLACES PREVIOUS PRESCRIPTION), Disp: , Rfl:     potassium chloride ER (K-TAB/KLOR-CON) 10 MEQ CR tablet, Take 1 tablet by mouth daily with food, Disp: , Rfl:     predniSONE (DELTASONE) 1 MG tablet, Take 3 mg by mouth, Disp: , Rfl:     spironolactone (ALDACTONE) 25 MG tablet, Take 25 mg by mouth., Disp: , Rfl:     Vitamin D3 (VITAMIN D-1000 MAX ST) 25 mcg (1000 units) tablet, Take 1,000 Units by mouth daily, Disp: , Rfl:     warfarin ANTICOAGULANT (COUMADIN) 3 MG tablet, Take by mouth 6 mg (3 mg x 2) every Tue, Sat; 4.5 mg (3 mg x 1.5) all other days, Disp: , Rfl:     ferrous sulfate (FEROSUL) 325 (65 Fe) MG tablet, Take 325 mg by mouth (Patient not taking: Reported on 5/28/2025), Disp: , Rfl:     Ferrous Sulfate 5 MG/20ML LIQD, ferrous sulfate (Patient not taking: Reported on 5/28/2025), Disp: , Rfl:   ALLERGIES:    Allergies   Allergen Reactions    Bee Venom Hives and Rash     SOCIAL HISTORY:   Social History     Socioeconomic History    Marital status:      Spouse name: Not on file    Number of children: Not on file    Years of education: Not on file    Highest education level: Not on file   Occupational History    Not on file   Tobacco Use    Smoking status: Former     Types: Cigarettes    Smokeless tobacco: Former   Substance and Sexual Activity    Alcohol use: Not on file    Drug use: Not on file    Sexual activity: Not on file   Other Topics Concern    Not on file   Social History Narrative    Not on file     Social Drivers of Health     Financial Resource Strain: Low Risk  (5/8/2023)     "[facility]    Financial Resource Strain     Difficulty of Paying Living Expenses: 3     Difficulty of Paying Living Expenses: Not on file   Food Insecurity: No Food Insecurity (5/8/2023)    [facility]    Food Insecurity     Worried About Running Out of Food in the Last Year: 1   Transportation Needs: No Transportation Needs (5/8/2023)    Received from [email or web address]    Transportation Needs     Lack of Transportation (Medical): 1   Physical Activity: Inactive (8/27/2020)    Received from [facility]    Exercise Vital Sign     Days of Exercise per Week: 0 days     Minutes of Exercise per Session: 30 min   Stress: No Stress Concern Present (8/27/2020)    Received from [facility]    [employer] [place] [employer] - Occupational Stress Questionnaire     Feeling of Stress : Only a little   Social Connections: Unknown (5/8/2024)    Received from [email or web address]    Social Connections     Frequency of Communication with Friends and Family: Not on file   Interpersonal Safety: Not on file   Housing Stability: Low Risk  (5/8/2023)    Received from [email or web address]    Housing Stability     Unable to Pay for Housing in the Last Year: 1     FAMILY HISTORY: History reviewed. No pertinent family history.  EXAM:Vitals: Ht 1.765 m (5' 9.5\")   Wt 88.5 kg (195 lb)   BMI 28.38 kg/m    BMI= Body mass index is 28.38 kg/m .    General appearance: Patient is alert and fully cooperative with history & exam.  No sign of distress is noted during the visit.     Psychiatric: Affect is pleasant & appropriate.  Patient appears motivated to improve health.     Respiratory: Breathing is regular & unlabored while sitting.     HEENT: Hearing is intact to spoken word.  Speech is clear.  No gross evidence of visual impairment that would impact ambulation.     "   Vascular: DP 1/4 & PT 0/4 left & right.  CFT delayed with dependent rubor noted about the digits.  Diminished hair growth distal to mid tibia and no hair about the foot and toes.  Temperature changes noted, warm to cool proximal to distal.  Hemosiderin pigmentation noted with multiple varicosities legs and feet bilateral. Generalized edema bilateral legs and feet.  Pt denies claudication history.     Neurologic: Normal plantar response bilateral.  Diminished protective threshold plus 0/10 applications of a 5.07 monofilament.  Pt admits  burning and paraesthesias about the feet and toes with palpation.     Dermatologic: All 10 nails are dystrophic but in reasonable repair diminished texture turgor and tone about the integument.  Skin is thin & shiny.  Previous ulceration about the plantar right first metatarsal head is now closed with a hyperkeratosis and dry hematogenous exudate in the hyperkeratosis noted.  Hyperkeratosis also noted of bilateral fifth metatarsal heads plantarly.  No ulcerations noted with debridement.       Musculoskeletal: Patient is ambulatory without assistive device or brace.  There is semi reducible contracture of the lesser digits.  Subtle discomfort is noted about the lateral ankle sinus tarsi peroneal tendons fifth metatarsal base.  No palpable localized edema.  No erythema or heat.  Mildly reduced range of motion through the ankle and subtalar joint.    Creatinine (mg/dL)   Date Value   12/07/2022 1.07   11/23/2022 1.32 (H)   11/20/2022 1.36 (H)     ASSESSMENT:       ICD-10-CM    1. 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 ...      2. History of diabetic ulcer of foot  Z86.31 Orthotics, Mastectomy and Custom Compression Orders  Type: Orthotic; Orthotic Type Requested: Diabetic Shoe(s)/Insert(s); X-Depth Shoe(s): Yes; Insert(s) Quantity: 3 pair; Req Documentation: Progress note must support the need for shoes/orthotics. ...      3. Hallux limitus of right foot  M20.5X1 Orthotics, Mastectomy and Custom Compression Orders Type: Orthotic; Orthotic Type Requested: Diabetic Shoe(s)/Insert(s); X-Depth Shoe(s): Yes; Insert(s) Quantity: 3 pair; Req Documentation: Progress note must support the need for shoes/orthotics. ...      4. Type 2 diabetes mellitus with foot ulcer, with long-term current use of insulin (H)  E11.621 Orthotics, Mastectomy and Custom Compression Orders Type: Orthotic; Orthotic Type Requested: Diabetic Shoe(s)/Insert(s); X-Depth Shoe(s): Yes; Insert(s) Quantity: 3 pair; Req Documentation: Progress note must support the need for shoes/orthotics. ...    L97.509     Z79.4           PLAN:    8/30/2023  Recommend appropriate shoe gear to provide stability about the lateral column peroneal tendons.  Just got DM shoes within this year.  Discussed the importance of appropriate shoe gear.  May modify the shoe if they are not working properly or fitting well.  All questions were answered  Follow-up if this remains symptomatic otherwise once yearly for diabetic foot exam.    6/24/2024  I debrided the ulceration plantar right first metatarsal head to and including subcutaneous tissue with a 15 blade scalpel.  Order for DM shoes at [place] to be able to offload first and fifth met heads.  Sterile dressing applied  RTC 1-2 months to confirm the ulceration is resolved  Due to neuropathy recommend staying in diabetic shoes only.    8/21/2024  Gets DM shoes in 8/28/24  So follow up here in 6 weeks and then probably 9 months so that we can keep Dm shoes in good repair every 12 months     10/2/2024  Patient did obtain new diabetic shoes since last visit and has had them adjusted a couple of times.  They appear to be protecting him well and he is  recovered with closed ulceration on the plantar first metatarsal head.  He is leaving for [place] for about 6 months, and he feels it best to follow-up again in about 2 months to confirm that these ulcerations remain resolved.  I think that does make sense as it will be quite a bit of time before he will follow-up here.  Follow-up again in about 2 months just before he goes to [place].  Follow-up sooner if necessary with any drainage or discoloration on the bottom of the feet.  All questions were answered.  Patient is very high risk for ulceration, admission, amputation, with deformity vascular disease neuropathy diabetes.    12/11/2024    At this time no ulcerations are noted and the shoe appears to be conforming quite well.  If he still has complaints we could try different shoe or wait until the next cycle if he can manage it until he is eligible again.      5/28/2025  RTC 6 months  Order for diabetic shoes today.    He does not like his current shoe as it is too heavy and applies pressure on the lateral left calcaneus.  Minimal debridement of the plantar first metatarsal head ulcers plantar first fifth metatarsal head as there is subtle hyperkeratosis with subtle hematogenous exudate but they are dry.      Darrian Degroot DPM